# Patient Record
Sex: FEMALE | Race: WHITE | NOT HISPANIC OR LATINO | URBAN - METROPOLITAN AREA
[De-identification: names, ages, dates, MRNs, and addresses within clinical notes are randomized per-mention and may not be internally consistent; named-entity substitution may affect disease eponyms.]

---

## 2017-01-01 ENCOUNTER — INPATIENT (INPATIENT)
Facility: HOSPITAL | Age: 82
LOS: 13 days | DRG: 871 | End: 2017-02-02
Attending: HOSPITALIST | Admitting: HOSPITALIST
Payer: MEDICARE

## 2017-01-01 VITALS
RESPIRATION RATE: 24 BRPM | HEIGHT: 61 IN | HEART RATE: 180 BPM | DIASTOLIC BLOOD PRESSURE: 30 MMHG | WEIGHT: 134.04 LBS | SYSTOLIC BLOOD PRESSURE: 70 MMHG

## 2017-01-01 VITALS
TEMPERATURE: 98 F | HEART RATE: 116 BPM | OXYGEN SATURATION: 94 % | RESPIRATION RATE: 19 BRPM | DIASTOLIC BLOOD PRESSURE: 76 MMHG | SYSTOLIC BLOOD PRESSURE: 162 MMHG

## 2017-01-01 DIAGNOSIS — Z98.890 OTHER SPECIFIED POSTPROCEDURAL STATES: Chronic | ICD-10-CM

## 2017-01-01 DIAGNOSIS — I48.91 UNSPECIFIED ATRIAL FIBRILLATION: ICD-10-CM

## 2017-01-01 DIAGNOSIS — Z41.8 ENCOUNTER FOR OTHER PROCEDURES FOR PURPOSES OTHER THAN REMEDYING HEALTH STATE: ICD-10-CM

## 2017-01-01 DIAGNOSIS — E03.9 HYPOTHYROIDISM, UNSPECIFIED: ICD-10-CM

## 2017-01-01 DIAGNOSIS — A41.9 SEPSIS, UNSPECIFIED ORGANISM: ICD-10-CM

## 2017-01-01 DIAGNOSIS — N17.9 ACUTE KIDNEY FAILURE, UNSPECIFIED: ICD-10-CM

## 2017-01-01 DIAGNOSIS — E78.5 HYPERLIPIDEMIA, UNSPECIFIED: ICD-10-CM

## 2017-01-01 DIAGNOSIS — I10 ESSENTIAL (PRIMARY) HYPERTENSION: ICD-10-CM

## 2017-01-01 LAB
ALBUMIN SERPL ELPH-MCNC: 1.9 G/DL — LOW (ref 3.3–5)
ALBUMIN SERPL ELPH-MCNC: 2.1 G/DL — LOW (ref 3.3–5)
ALBUMIN SERPL ELPH-MCNC: 2.3 G/DL — LOW (ref 3.3–5)
ALBUMIN SERPL ELPH-MCNC: 2.4 G/DL — LOW (ref 3.3–5)
ALBUMIN SERPL ELPH-MCNC: 2.5 G/DL — LOW (ref 3.3–5)
ALP SERPL-CCNC: 103 U/L — SIGNIFICANT CHANGE UP (ref 40–120)
ALP SERPL-CCNC: 106 U/L — SIGNIFICANT CHANGE UP (ref 40–120)
ALP SERPL-CCNC: 73 U/L — SIGNIFICANT CHANGE UP (ref 40–120)
ALP SERPL-CCNC: 88 U/L — SIGNIFICANT CHANGE UP (ref 40–120)
ALP SERPL-CCNC: 90 U/L — SIGNIFICANT CHANGE UP (ref 40–120)
ALT FLD-CCNC: 23 U/L — SIGNIFICANT CHANGE UP (ref 12–78)
ALT FLD-CCNC: 31 U/L — SIGNIFICANT CHANGE UP (ref 12–78)
ALT FLD-CCNC: 37 U/L — SIGNIFICANT CHANGE UP (ref 12–78)
ALT FLD-CCNC: 38 U/L — SIGNIFICANT CHANGE UP (ref 12–78)
ALT FLD-CCNC: 40 U/L — SIGNIFICANT CHANGE UP (ref 12–78)
ANION GAP SERPL CALC-SCNC: 10 MMOL/L — SIGNIFICANT CHANGE UP (ref 5–17)
ANION GAP SERPL CALC-SCNC: 11 MMOL/L — SIGNIFICANT CHANGE UP (ref 5–17)
ANION GAP SERPL CALC-SCNC: 12 MMOL/L — SIGNIFICANT CHANGE UP (ref 5–17)
ANION GAP SERPL CALC-SCNC: 13 MMOL/L — SIGNIFICANT CHANGE UP (ref 5–17)
ANION GAP SERPL CALC-SCNC: 14 MMOL/L — SIGNIFICANT CHANGE UP (ref 5–17)
ANION GAP SERPL CALC-SCNC: 14 MMOL/L — SIGNIFICANT CHANGE UP (ref 5–17)
ANION GAP SERPL CALC-SCNC: 9 MMOL/L — SIGNIFICANT CHANGE UP (ref 5–17)
ANION GAP SERPL CALC-SCNC: 9 MMOL/L — SIGNIFICANT CHANGE UP (ref 5–17)
ANISOCYTOSIS BLD QL: SLIGHT — SIGNIFICANT CHANGE UP
APPEARANCE UR: ABNORMAL
APPEARANCE UR: ABNORMAL
APPEARANCE UR: CLEAR — SIGNIFICANT CHANGE UP
APTT BLD: 27.1 SEC — LOW (ref 27.5–37.4)
APTT BLD: 31.9 SEC — SIGNIFICANT CHANGE UP (ref 27.5–37.4)
AST SERPL-CCNC: 23 U/L — SIGNIFICANT CHANGE UP (ref 15–37)
AST SERPL-CCNC: 35 U/L — SIGNIFICANT CHANGE UP (ref 15–37)
AST SERPL-CCNC: 37 U/L — SIGNIFICANT CHANGE UP (ref 15–37)
AST SERPL-CCNC: 59 U/L — HIGH (ref 15–37)
AST SERPL-CCNC: 67 U/L — HIGH (ref 15–37)
BACTERIA # UR AUTO: ABNORMAL
BASE EXCESS BLDA CALC-SCNC: 4.1 MMOL/L — HIGH (ref -2–2)
BASOPHILS # BLD AUTO: 0 K/UL — SIGNIFICANT CHANGE UP (ref 0–0.2)
BASOPHILS NFR BLD AUTO: 0.2 % — SIGNIFICANT CHANGE UP (ref 0–2)
BILIRUB SERPL-MCNC: 0.5 MG/DL — SIGNIFICANT CHANGE UP (ref 0.2–1.2)
BILIRUB SERPL-MCNC: 0.5 MG/DL — SIGNIFICANT CHANGE UP (ref 0.2–1.2)
BILIRUB SERPL-MCNC: 0.7 MG/DL — SIGNIFICANT CHANGE UP (ref 0.2–1.2)
BILIRUB SERPL-MCNC: 0.7 MG/DL — SIGNIFICANT CHANGE UP (ref 0.2–1.2)
BILIRUB SERPL-MCNC: 0.8 MG/DL — SIGNIFICANT CHANGE UP (ref 0.2–1.2)
BILIRUB UR-MCNC: ABNORMAL
BILIRUB UR-MCNC: NEGATIVE — SIGNIFICANT CHANGE UP
BILIRUB UR-MCNC: NEGATIVE — SIGNIFICANT CHANGE UP
BLOOD GAS COMMENTS ARTERIAL: SIGNIFICANT CHANGE UP
BLOOD GAS COMMENTS ARTERIAL: SIGNIFICANT CHANGE UP
BUN SERPL-MCNC: 19 MG/DL — SIGNIFICANT CHANGE UP (ref 7–23)
BUN SERPL-MCNC: 21 MG/DL — SIGNIFICANT CHANGE UP (ref 7–23)
BUN SERPL-MCNC: 22 MG/DL — SIGNIFICANT CHANGE UP (ref 7–23)
BUN SERPL-MCNC: 23 MG/DL — SIGNIFICANT CHANGE UP (ref 7–23)
BUN SERPL-MCNC: 24 MG/DL — HIGH (ref 7–23)
BUN SERPL-MCNC: 24 MG/DL — HIGH (ref 7–23)
BUN SERPL-MCNC: 25 MG/DL — HIGH (ref 7–23)
BUN SERPL-MCNC: 27 MG/DL — HIGH (ref 7–23)
BUN SERPL-MCNC: 27 MG/DL — HIGH (ref 7–23)
BUN SERPL-MCNC: 28 MG/DL — HIGH (ref 7–23)
BUN SERPL-MCNC: 28 MG/DL — HIGH (ref 7–23)
BUN SERPL-MCNC: 29 MG/DL — HIGH (ref 7–23)
BUN SERPL-MCNC: 30 MG/DL — HIGH (ref 7–23)
BUN SERPL-MCNC: 31 MG/DL — HIGH (ref 7–23)
BUN SERPL-MCNC: 37 MG/DL — HIGH (ref 7–23)
BUN SERPL-MCNC: 43 MG/DL — HIGH (ref 7–23)
BUN SERPL-MCNC: 50 MG/DL — HIGH (ref 7–23)
CALCIUM SERPL-MCNC: 7.4 MG/DL — LOW (ref 8.5–10.1)
CALCIUM SERPL-MCNC: 7.5 MG/DL — LOW (ref 8.5–10.1)
CALCIUM SERPL-MCNC: 7.8 MG/DL — LOW (ref 8.5–10.1)
CALCIUM SERPL-MCNC: 7.9 MG/DL — LOW (ref 8.5–10.1)
CALCIUM SERPL-MCNC: 8.1 MG/DL — LOW (ref 8.5–10.1)
CALCIUM SERPL-MCNC: 8.1 MG/DL — LOW (ref 8.5–10.1)
CALCIUM SERPL-MCNC: 8.2 MG/DL — LOW (ref 8.5–10.1)
CALCIUM SERPL-MCNC: 8.3 MG/DL — LOW (ref 8.5–10.1)
CALCIUM SERPL-MCNC: 8.3 MG/DL — LOW (ref 8.5–10.1)
CALCIUM SERPL-MCNC: 8.6 MG/DL — SIGNIFICANT CHANGE UP (ref 8.5–10.1)
CALCIUM SERPL-MCNC: 8.6 MG/DL — SIGNIFICANT CHANGE UP (ref 8.5–10.1)
CALCIUM SERPL-MCNC: 8.7 MG/DL — SIGNIFICANT CHANGE UP (ref 8.5–10.1)
CALCIUM SERPL-MCNC: 8.8 MG/DL — SIGNIFICANT CHANGE UP (ref 8.5–10.1)
CALCIUM SERPL-MCNC: 8.9 MG/DL — SIGNIFICANT CHANGE UP (ref 8.5–10.1)
CALCIUM SERPL-MCNC: 9 MG/DL — SIGNIFICANT CHANGE UP (ref 8.5–10.1)
CHLORIDE SERPL-SCNC: 105 MMOL/L — SIGNIFICANT CHANGE UP (ref 96–108)
CHLORIDE SERPL-SCNC: 106 MMOL/L — SIGNIFICANT CHANGE UP (ref 96–108)
CHLORIDE SERPL-SCNC: 107 MMOL/L — SIGNIFICANT CHANGE UP (ref 96–108)
CHLORIDE SERPL-SCNC: 108 MMOL/L — SIGNIFICANT CHANGE UP (ref 96–108)
CHLORIDE SERPL-SCNC: 109 MMOL/L — HIGH (ref 96–108)
CHLORIDE SERPL-SCNC: 110 MMOL/L — HIGH (ref 96–108)
CHLORIDE SERPL-SCNC: 111 MMOL/L — HIGH (ref 96–108)
CHLORIDE SERPL-SCNC: 112 MMOL/L — HIGH (ref 96–108)
CHLORIDE SERPL-SCNC: 114 MMOL/L — HIGH (ref 96–108)
CHLORIDE SERPL-SCNC: 114 MMOL/L — HIGH (ref 96–108)
CHLORIDE SERPL-SCNC: 116 MMOL/L — HIGH (ref 96–108)
CK MB BLD-MCNC: 4.6 % — HIGH (ref 0–3.5)
CK MB CFR SERPL CALC: 10.4 NG/ML — HIGH (ref 0–3.6)
CK SERPL-CCNC: 227 U/L — HIGH (ref 26–192)
CO2 SERPL-SCNC: 20 MMOL/L — LOW (ref 22–31)
CO2 SERPL-SCNC: 22 MMOL/L — SIGNIFICANT CHANGE UP (ref 22–31)
CO2 SERPL-SCNC: 22 MMOL/L — SIGNIFICANT CHANGE UP (ref 22–31)
CO2 SERPL-SCNC: 24 MMOL/L — SIGNIFICANT CHANGE UP (ref 22–31)
CO2 SERPL-SCNC: 25 MMOL/L — SIGNIFICANT CHANGE UP (ref 22–31)
CO2 SERPL-SCNC: 25 MMOL/L — SIGNIFICANT CHANGE UP (ref 22–31)
CO2 SERPL-SCNC: 27 MMOL/L — SIGNIFICANT CHANGE UP (ref 22–31)
CO2 SERPL-SCNC: 28 MMOL/L — SIGNIFICANT CHANGE UP (ref 22–31)
CO2 SERPL-SCNC: 28 MMOL/L — SIGNIFICANT CHANGE UP (ref 22–31)
CO2 SERPL-SCNC: 29 MMOL/L — SIGNIFICANT CHANGE UP (ref 22–31)
CO2 SERPL-SCNC: 30 MMOL/L — SIGNIFICANT CHANGE UP (ref 22–31)
COLOR SPEC: YELLOW — SIGNIFICANT CHANGE UP
COMMENT - URINE: SIGNIFICANT CHANGE UP
CREAT SERPL-MCNC: 0.9 MG/DL — SIGNIFICANT CHANGE UP (ref 0.5–1.3)
CREAT SERPL-MCNC: 0.99 MG/DL — SIGNIFICANT CHANGE UP (ref 0.5–1.3)
CREAT SERPL-MCNC: 0.99 MG/DL — SIGNIFICANT CHANGE UP (ref 0.5–1.3)
CREAT SERPL-MCNC: 1 MG/DL — SIGNIFICANT CHANGE UP (ref 0.5–1.3)
CREAT SERPL-MCNC: 1.1 MG/DL — SIGNIFICANT CHANGE UP (ref 0.5–1.3)
CREAT SERPL-MCNC: 1.2 MG/DL — SIGNIFICANT CHANGE UP (ref 0.5–1.3)
CREAT SERPL-MCNC: 1.3 MG/DL — SIGNIFICANT CHANGE UP (ref 0.5–1.3)
CREAT SERPL-MCNC: 1.4 MG/DL — HIGH (ref 0.5–1.3)
CREAT SERPL-MCNC: 1.7 MG/DL — HIGH (ref 0.5–1.3)
CREAT SERPL-MCNC: 2.3 MG/DL — HIGH (ref 0.5–1.3)
CULTURE RESULTS: NO GROWTH — SIGNIFICANT CHANGE UP
CULTURE RESULTS: NO GROWTH — SIGNIFICANT CHANGE UP
CULTURE RESULTS: SIGNIFICANT CHANGE UP
DIFF PNL FLD: ABNORMAL
DIGOXIN SERPL-MCNC: 1.9 NG/ML — SIGNIFICANT CHANGE UP (ref 0.8–2)
ELLIPTOCYTES BLD QL SMEAR: SLIGHT — SIGNIFICANT CHANGE UP
EOSINOPHIL # BLD AUTO: 0 K/UL — SIGNIFICANT CHANGE UP (ref 0–0.5)
EOSINOPHIL NFR BLD AUTO: 0.1 % — SIGNIFICANT CHANGE UP (ref 0–6)
EPI CELLS # UR: SIGNIFICANT CHANGE UP
GLUCOSE SERPL-MCNC: 112 MG/DL — HIGH (ref 70–99)
GLUCOSE SERPL-MCNC: 115 MG/DL — HIGH (ref 70–99)
GLUCOSE SERPL-MCNC: 116 MG/DL — HIGH (ref 70–99)
GLUCOSE SERPL-MCNC: 117 MG/DL — HIGH (ref 70–99)
GLUCOSE SERPL-MCNC: 120 MG/DL — HIGH (ref 70–99)
GLUCOSE SERPL-MCNC: 122 MG/DL — HIGH (ref 70–99)
GLUCOSE SERPL-MCNC: 143 MG/DL — HIGH (ref 70–99)
GLUCOSE SERPL-MCNC: 143 MG/DL — HIGH (ref 70–99)
GLUCOSE SERPL-MCNC: 148 MG/DL — HIGH (ref 70–99)
GLUCOSE SERPL-MCNC: 150 MG/DL — HIGH (ref 70–99)
GLUCOSE SERPL-MCNC: 151 MG/DL — HIGH (ref 70–99)
GLUCOSE SERPL-MCNC: 158 MG/DL — HIGH (ref 70–99)
GLUCOSE SERPL-MCNC: 159 MG/DL — HIGH (ref 70–99)
GLUCOSE SERPL-MCNC: 161 MG/DL — HIGH (ref 70–99)
GLUCOSE SERPL-MCNC: 162 MG/DL — HIGH (ref 70–99)
GLUCOSE SERPL-MCNC: 163 MG/DL — HIGH (ref 70–99)
GLUCOSE SERPL-MCNC: 301 MG/DL — HIGH (ref 70–99)
GLUCOSE UR QL: NEGATIVE — SIGNIFICANT CHANGE UP
GRAM STN FLD: SIGNIFICANT CHANGE UP
HCO3 BLDA-SCNC: 28 MMOL/L — HIGH (ref 23–27)
HCT VFR BLD CALC: 30.3 % — LOW (ref 34.5–45)
HCT VFR BLD CALC: 31.5 % — LOW (ref 34.5–45)
HCT VFR BLD CALC: 31.7 % — LOW (ref 34.5–45)
HCT VFR BLD CALC: 31.9 % — LOW (ref 34.5–45)
HCT VFR BLD CALC: 32.4 % — LOW (ref 34.5–45)
HCT VFR BLD CALC: 34.8 % — SIGNIFICANT CHANGE UP (ref 34.5–45)
HCT VFR BLD CALC: 35.1 % — SIGNIFICANT CHANGE UP (ref 34.5–45)
HCT VFR BLD CALC: 35.4 % — SIGNIFICANT CHANGE UP (ref 34.5–45)
HCT VFR BLD CALC: 35.7 % — SIGNIFICANT CHANGE UP (ref 34.5–45)
HCT VFR BLD CALC: 36 % — SIGNIFICANT CHANGE UP (ref 34.5–45)
HCT VFR BLD CALC: 36.2 % — SIGNIFICANT CHANGE UP (ref 34.5–45)
HCT VFR BLD CALC: 36.2 % — SIGNIFICANT CHANGE UP (ref 34.5–45)
HCT VFR BLD CALC: 36.5 % — SIGNIFICANT CHANGE UP (ref 34.5–45)
HCT VFR BLD CALC: 37.2 % — SIGNIFICANT CHANGE UP (ref 34.5–45)
HCT VFR BLD CALC: 38.4 % — SIGNIFICANT CHANGE UP (ref 34.5–45)
HGB BLD-MCNC: 10.2 G/DL — LOW (ref 11.5–15.5)
HGB BLD-MCNC: 10.4 G/DL — LOW (ref 11.5–15.5)
HGB BLD-MCNC: 10.7 G/DL — LOW (ref 11.5–15.5)
HGB BLD-MCNC: 11.2 G/DL — LOW (ref 11.5–15.5)
HGB BLD-MCNC: 11.2 G/DL — LOW (ref 11.5–15.5)
HGB BLD-MCNC: 11.4 G/DL — LOW (ref 11.5–15.5)
HGB BLD-MCNC: 11.4 G/DL — LOW (ref 11.5–15.5)
HGB BLD-MCNC: 11.6 G/DL — SIGNIFICANT CHANGE UP (ref 11.5–15.5)
HGB BLD-MCNC: 11.6 G/DL — SIGNIFICANT CHANGE UP (ref 11.5–15.5)
HGB BLD-MCNC: 11.7 G/DL — SIGNIFICANT CHANGE UP (ref 11.5–15.5)
HGB BLD-MCNC: 11.7 G/DL — SIGNIFICANT CHANGE UP (ref 11.5–15.5)
HGB BLD-MCNC: 12 G/DL — SIGNIFICANT CHANGE UP (ref 11.5–15.5)
HGB BLD-MCNC: 12 G/DL — SIGNIFICANT CHANGE UP (ref 11.5–15.5)
HGB BLD-MCNC: 9.8 G/DL — LOW (ref 11.5–15.5)
HGB BLD-MCNC: 9.9 G/DL — LOW (ref 11.5–15.5)
HOROWITZ INDEX BLDA+IHG-RTO: 50 — SIGNIFICANT CHANGE UP
INR BLD: 1.34 RATIO — HIGH (ref 0.88–1.16)
INR BLD: 1.53 RATIO — HIGH (ref 0.88–1.16)
KETONES UR-MCNC: NEGATIVE — SIGNIFICANT CHANGE UP
LACTATE SERPL-SCNC: 1.8 MMOL/L — SIGNIFICANT CHANGE UP (ref 0.7–2)
LACTATE SERPL-SCNC: 2.2 MMOL/L — HIGH (ref 0.7–2)
LACTATE SERPL-SCNC: 2.3 MMOL/L — HIGH (ref 0.7–2)
LACTATE SERPL-SCNC: 2.8 MMOL/L — HIGH (ref 0.7–2)
LACTATE SERPL-SCNC: 2.8 MMOL/L — HIGH (ref 0.7–2)
LACTATE SERPL-SCNC: 2.9 MMOL/L — HIGH (ref 0.7–2)
LDH SERPL L TO P-CCNC: 318 U/L — HIGH (ref 50–242)
LDH SERPL L TO P-CCNC: 536 U/L — HIGH (ref 50–242)
LEGIONELLA AG UR QL: NEGATIVE — SIGNIFICANT CHANGE UP
LEUKOCYTE ESTERASE UR-ACNC: ABNORMAL
LEUKOCYTE ESTERASE UR-ACNC: ABNORMAL
LEUKOCYTE ESTERASE UR-ACNC: NEGATIVE — SIGNIFICANT CHANGE UP
LYMPHOCYTES # BLD AUTO: 1 % — LOW (ref 13–44)
LYMPHOCYTES # BLD AUTO: 1.4 K/UL — SIGNIFICANT CHANGE UP (ref 1–3.3)
LYMPHOCYTES # BLD AUTO: 5 % — LOW (ref 13–44)
LYMPHOCYTES # BLD AUTO: 6 % — LOW (ref 13–44)
LYMPHOCYTES # BLD AUTO: 7 % — LOW (ref 13–44)
LYMPHOCYTES # BLD AUTO: 7.1 % — LOW (ref 13–44)
MAGNESIUM SERPL-MCNC: 1.8 MG/DL — SIGNIFICANT CHANGE UP (ref 1.8–2.4)
MAGNESIUM SERPL-MCNC: 2 MG/DL — SIGNIFICANT CHANGE UP (ref 1.8–2.4)
MAGNESIUM SERPL-MCNC: 2.1 MG/DL — SIGNIFICANT CHANGE UP (ref 1.8–2.4)
MAGNESIUM SERPL-MCNC: 2.2 MG/DL — SIGNIFICANT CHANGE UP (ref 1.8–2.4)
MAGNESIUM SERPL-MCNC: 2.2 MG/DL — SIGNIFICANT CHANGE UP (ref 1.8–2.4)
MAGNESIUM SERPL-MCNC: 2.3 MG/DL — SIGNIFICANT CHANGE UP (ref 1.8–2.4)
MCHC RBC-ENTMCNC: 26.1 PG — LOW (ref 27–34)
MCHC RBC-ENTMCNC: 26.1 PG — LOW (ref 27–34)
MCHC RBC-ENTMCNC: 26.2 PG — LOW (ref 27–34)
MCHC RBC-ENTMCNC: 26.2 PG — LOW (ref 27–34)
MCHC RBC-ENTMCNC: 26.3 PG — LOW (ref 27–34)
MCHC RBC-ENTMCNC: 26.5 PG — LOW (ref 27–34)
MCHC RBC-ENTMCNC: 26.5 PG — LOW (ref 27–34)
MCHC RBC-ENTMCNC: 26.6 PG — LOW (ref 27–34)
MCHC RBC-ENTMCNC: 26.8 PG — LOW (ref 27–34)
MCHC RBC-ENTMCNC: 26.9 PG — LOW (ref 27–34)
MCHC RBC-ENTMCNC: 27 PG — SIGNIFICANT CHANGE UP (ref 27–34)
MCHC RBC-ENTMCNC: 27.2 PG — SIGNIFICANT CHANGE UP (ref 27–34)
MCHC RBC-ENTMCNC: 27.7 PG — SIGNIFICANT CHANGE UP (ref 27–34)
MCHC RBC-ENTMCNC: 31.3 GM/DL — LOW (ref 32–36)
MCHC RBC-ENTMCNC: 31.3 GM/DL — LOW (ref 32–36)
MCHC RBC-ENTMCNC: 31.7 GM/DL — LOW (ref 32–36)
MCHC RBC-ENTMCNC: 31.8 GM/DL — LOW (ref 32–36)
MCHC RBC-ENTMCNC: 31.8 GM/DL — LOW (ref 32–36)
MCHC RBC-ENTMCNC: 32 GM/DL — SIGNIFICANT CHANGE UP (ref 32–36)
MCHC RBC-ENTMCNC: 32.2 GM/DL — SIGNIFICANT CHANGE UP (ref 32–36)
MCHC RBC-ENTMCNC: 32.3 GM/DL — SIGNIFICANT CHANGE UP (ref 32–36)
MCHC RBC-ENTMCNC: 32.4 GM/DL — SIGNIFICANT CHANGE UP (ref 32–36)
MCHC RBC-ENTMCNC: 32.4 GM/DL — SIGNIFICANT CHANGE UP (ref 32–36)
MCHC RBC-ENTMCNC: 32.6 GM/DL — SIGNIFICANT CHANGE UP (ref 32–36)
MCHC RBC-ENTMCNC: 32.7 GM/DL — SIGNIFICANT CHANGE UP (ref 32–36)
MCHC RBC-ENTMCNC: 32.9 GM/DL — SIGNIFICANT CHANGE UP (ref 32–36)
MCV RBC AUTO: 80.4 FL — SIGNIFICANT CHANGE UP (ref 80–100)
MCV RBC AUTO: 80.8 FL — SIGNIFICANT CHANGE UP (ref 80–100)
MCV RBC AUTO: 81.2 FL — SIGNIFICANT CHANGE UP (ref 80–100)
MCV RBC AUTO: 81.2 FL — SIGNIFICANT CHANGE UP (ref 80–100)
MCV RBC AUTO: 81.8 FL — SIGNIFICANT CHANGE UP (ref 80–100)
MCV RBC AUTO: 82 FL — SIGNIFICANT CHANGE UP (ref 80–100)
MCV RBC AUTO: 82.4 FL — SIGNIFICANT CHANGE UP (ref 80–100)
MCV RBC AUTO: 82.5 FL — SIGNIFICANT CHANGE UP (ref 80–100)
MCV RBC AUTO: 82.8 FL — SIGNIFICANT CHANGE UP (ref 80–100)
MCV RBC AUTO: 83.5 FL — SIGNIFICANT CHANGE UP (ref 80–100)
MCV RBC AUTO: 84 FL — SIGNIFICANT CHANGE UP (ref 80–100)
MCV RBC AUTO: 84.4 FL — SIGNIFICANT CHANGE UP (ref 80–100)
MCV RBC AUTO: 84.7 FL — SIGNIFICANT CHANGE UP (ref 80–100)
MCV RBC AUTO: 85.8 FL — SIGNIFICANT CHANGE UP (ref 80–100)
MCV RBC AUTO: 86.2 FL — SIGNIFICANT CHANGE UP (ref 80–100)
MICROCYTES BLD QL: SLIGHT — SIGNIFICANT CHANGE UP
MONOCYTES # BLD AUTO: 0.7 K/UL — SIGNIFICANT CHANGE UP (ref 0–0.9)
MONOCYTES NFR BLD AUTO: 1 % — SIGNIFICANT CHANGE UP (ref 1–9)
MONOCYTES NFR BLD AUTO: 3 % — SIGNIFICANT CHANGE UP (ref 1–9)
MONOCYTES NFR BLD AUTO: 3.5 % — SIGNIFICANT CHANGE UP (ref 1–9)
MONOCYTES NFR BLD AUTO: 4 % — SIGNIFICANT CHANGE UP (ref 1–9)
MONOCYTES NFR BLD AUTO: 5 % — SIGNIFICANT CHANGE UP (ref 1–9)
NEUTROPHILS # BLD AUTO: 17.6 K/UL — HIGH (ref 1.8–7.4)
NEUTROPHILS NFR BLD AUTO: 87 % — HIGH (ref 43–77)
NEUTROPHILS NFR BLD AUTO: 88 % — HIGH (ref 43–77)
NEUTROPHILS NFR BLD AUTO: 89 % — HIGH (ref 43–77)
NEUTROPHILS NFR BLD AUTO: 89.1 % — HIGH (ref 43–77)
NEUTROPHILS NFR BLD AUTO: 95 % — HIGH (ref 43–77)
NEUTS BAND # BLD: 2 % — SIGNIFICANT CHANGE UP (ref 0–8)
NEUTS VAC BLD QL SMEAR: SLIGHT — SIGNIFICANT CHANGE UP
NITRITE UR-MCNC: NEGATIVE — SIGNIFICANT CHANGE UP
NT-PROBNP SERPL-SCNC: HIGH PG/ML (ref 0–450)
PCO2 BLDA: 42 MMHG — SIGNIFICANT CHANGE UP (ref 32–46)
PH BLDA: 7.44 — SIGNIFICANT CHANGE UP (ref 7.35–7.45)
PH UR: 5 — SIGNIFICANT CHANGE UP (ref 4.8–8)
PHOSPHATE SERPL-MCNC: 2.9 MG/DL — SIGNIFICANT CHANGE UP (ref 2.5–4.5)
PHOSPHATE SERPL-MCNC: 3 MG/DL — SIGNIFICANT CHANGE UP (ref 2.5–4.5)
PHOSPHATE SERPL-MCNC: 3 MG/DL — SIGNIFICANT CHANGE UP (ref 2.5–4.5)
PHOSPHATE SERPL-MCNC: 3.1 MG/DL — SIGNIFICANT CHANGE UP (ref 2.5–4.5)
PHOSPHATE SERPL-MCNC: 3.2 MG/DL — SIGNIFICANT CHANGE UP (ref 2.5–4.5)
PHOSPHATE SERPL-MCNC: 3.5 MG/DL — SIGNIFICANT CHANGE UP (ref 2.5–4.5)
PHOSPHATE SERPL-MCNC: 3.6 MG/DL — SIGNIFICANT CHANGE UP (ref 2.5–4.5)
PHOSPHATE SERPL-MCNC: 3.7 MG/DL — SIGNIFICANT CHANGE UP (ref 2.5–4.5)
PHOSPHATE SERPL-MCNC: 4.1 MG/DL — SIGNIFICANT CHANGE UP (ref 2.5–4.5)
PLAT MORPH BLD: NORMAL — SIGNIFICANT CHANGE UP
PLATELET # BLD AUTO: 215 K/UL — SIGNIFICANT CHANGE UP (ref 150–400)
PLATELET # BLD AUTO: 240 K/UL — SIGNIFICANT CHANGE UP (ref 150–400)
PLATELET # BLD AUTO: 292 K/UL — SIGNIFICANT CHANGE UP (ref 150–400)
PLATELET # BLD AUTO: 352 K/UL — SIGNIFICANT CHANGE UP (ref 150–400)
PLATELET # BLD AUTO: 366 K/UL — SIGNIFICANT CHANGE UP (ref 150–400)
PLATELET # BLD AUTO: 400 K/UL — SIGNIFICANT CHANGE UP (ref 150–400)
PLATELET # BLD AUTO: 405 K/UL — HIGH (ref 150–400)
PLATELET # BLD AUTO: 410 K/UL — HIGH (ref 150–400)
PLATELET # BLD AUTO: 410 K/UL — HIGH (ref 150–400)
PLATELET # BLD AUTO: 415 K/UL — HIGH (ref 150–400)
PLATELET # BLD AUTO: 441 K/UL — HIGH (ref 150–400)
PLATELET # BLD AUTO: 453 K/UL — HIGH (ref 150–400)
PLATELET # BLD AUTO: 503 K/UL — HIGH (ref 150–400)
PLATELET # BLD AUTO: 545 K/UL — HIGH (ref 150–400)
PLATELET # BLD AUTO: 548 K/UL — HIGH (ref 150–400)
PO2 BLDA: 62 MMHG — LOW (ref 74–108)
POIKILOCYTOSIS BLD QL AUTO: SLIGHT — SIGNIFICANT CHANGE UP
POLYCHROMASIA BLD QL SMEAR: SLIGHT — SIGNIFICANT CHANGE UP
POTASSIUM SERPL-MCNC: 3.1 MMOL/L — LOW (ref 3.5–5.3)
POTASSIUM SERPL-MCNC: 3.1 MMOL/L — LOW (ref 3.5–5.3)
POTASSIUM SERPL-MCNC: 3.2 MMOL/L — LOW (ref 3.5–5.3)
POTASSIUM SERPL-MCNC: 3.3 MMOL/L — LOW (ref 3.5–5.3)
POTASSIUM SERPL-MCNC: 3.3 MMOL/L — LOW (ref 3.5–5.3)
POTASSIUM SERPL-MCNC: 3.4 MMOL/L — LOW (ref 3.5–5.3)
POTASSIUM SERPL-MCNC: 3.6 MMOL/L — SIGNIFICANT CHANGE UP (ref 3.5–5.3)
POTASSIUM SERPL-MCNC: 3.6 MMOL/L — SIGNIFICANT CHANGE UP (ref 3.5–5.3)
POTASSIUM SERPL-MCNC: 3.7 MMOL/L — SIGNIFICANT CHANGE UP (ref 3.5–5.3)
POTASSIUM SERPL-MCNC: 3.7 MMOL/L — SIGNIFICANT CHANGE UP (ref 3.5–5.3)
POTASSIUM SERPL-MCNC: 3.8 MMOL/L — SIGNIFICANT CHANGE UP (ref 3.5–5.3)
POTASSIUM SERPL-MCNC: 3.8 MMOL/L — SIGNIFICANT CHANGE UP (ref 3.5–5.3)
POTASSIUM SERPL-MCNC: 3.9 MMOL/L — SIGNIFICANT CHANGE UP (ref 3.5–5.3)
POTASSIUM SERPL-MCNC: 4.1 MMOL/L — SIGNIFICANT CHANGE UP (ref 3.5–5.3)
POTASSIUM SERPL-MCNC: 4.3 MMOL/L — SIGNIFICANT CHANGE UP (ref 3.5–5.3)
POTASSIUM SERPL-SCNC: 3.1 MMOL/L — LOW (ref 3.5–5.3)
POTASSIUM SERPL-SCNC: 3.1 MMOL/L — LOW (ref 3.5–5.3)
POTASSIUM SERPL-SCNC: 3.2 MMOL/L — LOW (ref 3.5–5.3)
POTASSIUM SERPL-SCNC: 3.3 MMOL/L — LOW (ref 3.5–5.3)
POTASSIUM SERPL-SCNC: 3.3 MMOL/L — LOW (ref 3.5–5.3)
POTASSIUM SERPL-SCNC: 3.4 MMOL/L — LOW (ref 3.5–5.3)
POTASSIUM SERPL-SCNC: 3.6 MMOL/L — SIGNIFICANT CHANGE UP (ref 3.5–5.3)
POTASSIUM SERPL-SCNC: 3.6 MMOL/L — SIGNIFICANT CHANGE UP (ref 3.5–5.3)
POTASSIUM SERPL-SCNC: 3.7 MMOL/L — SIGNIFICANT CHANGE UP (ref 3.5–5.3)
POTASSIUM SERPL-SCNC: 3.7 MMOL/L — SIGNIFICANT CHANGE UP (ref 3.5–5.3)
POTASSIUM SERPL-SCNC: 3.8 MMOL/L — SIGNIFICANT CHANGE UP (ref 3.5–5.3)
POTASSIUM SERPL-SCNC: 3.8 MMOL/L — SIGNIFICANT CHANGE UP (ref 3.5–5.3)
POTASSIUM SERPL-SCNC: 3.9 MMOL/L — SIGNIFICANT CHANGE UP (ref 3.5–5.3)
POTASSIUM SERPL-SCNC: 4.1 MMOL/L — SIGNIFICANT CHANGE UP (ref 3.5–5.3)
POTASSIUM SERPL-SCNC: 4.3 MMOL/L — SIGNIFICANT CHANGE UP (ref 3.5–5.3)
PROCALCITONIN SERPL-MCNC: 0.09 NG/ML — HIGH (ref 0–0.05)
PROCALCITONIN SERPL-MCNC: 0.09 NG/ML — HIGH (ref 0–0.05)
PROCALCITONIN SERPL-MCNC: 0.18 NG/ML — HIGH (ref 0–0.05)
PROT SERPL-MCNC: 5.8 G/DL — LOW (ref 6–8.3)
PROT SERPL-MCNC: 6 G/DL — SIGNIFICANT CHANGE UP (ref 6–8.3)
PROT SERPL-MCNC: 6.7 G/DL — SIGNIFICANT CHANGE UP (ref 6–8.3)
PROT SERPL-MCNC: 6.7 G/DL — SIGNIFICANT CHANGE UP (ref 6–8.3)
PROT SERPL-MCNC: 6.8 G/DL — SIGNIFICANT CHANGE UP (ref 6–8.3)
PROT UR-MCNC: 25 MG/DL
PROT UR-MCNC: 75 MG/DL
PROT UR-MCNC: 75 MG/DL
PROTHROM AB SERPL-ACNC: 14.9 SEC — HIGH (ref 10–13.1)
PROTHROM AB SERPL-ACNC: 17 SEC — HIGH (ref 10–13.1)
RAPID RVP RESULT: SIGNIFICANT CHANGE UP
RBC # BLD: 3.68 M/UL — LOW (ref 3.8–5.2)
RBC # BLD: 3.75 M/UL — LOW (ref 3.8–5.2)
RBC # BLD: 3.84 M/UL — SIGNIFICANT CHANGE UP (ref 3.8–5.2)
RBC # BLD: 3.85 M/UL — SIGNIFICANT CHANGE UP (ref 3.8–5.2)
RBC # BLD: 3.97 M/UL — SIGNIFICANT CHANGE UP (ref 3.8–5.2)
RBC # BLD: 4.21 M/UL — SIGNIFICANT CHANGE UP (ref 3.8–5.2)
RBC # BLD: 4.24 M/UL — SIGNIFICANT CHANGE UP (ref 3.8–5.2)
RBC # BLD: 4.31 M/UL — SIGNIFICANT CHANGE UP (ref 3.8–5.2)
RBC # BLD: 4.32 M/UL — SIGNIFICANT CHANGE UP (ref 3.8–5.2)
RBC # BLD: 4.37 M/UL — SIGNIFICANT CHANGE UP (ref 3.8–5.2)
RBC # BLD: 4.39 M/UL — SIGNIFICANT CHANGE UP (ref 3.8–5.2)
RBC # BLD: 4.4 M/UL — SIGNIFICANT CHANGE UP (ref 3.8–5.2)
RBC # BLD: 4.42 M/UL — SIGNIFICANT CHANGE UP (ref 3.8–5.2)
RBC # BLD: 4.43 M/UL — SIGNIFICANT CHANGE UP (ref 3.8–5.2)
RBC # BLD: 4.47 M/UL — SIGNIFICANT CHANGE UP (ref 3.8–5.2)
RBC # FLD: 13.3 % — SIGNIFICANT CHANGE UP (ref 10.3–14.5)
RBC # FLD: 13.8 % — SIGNIFICANT CHANGE UP (ref 10.3–14.5)
RBC # FLD: 13.8 % — SIGNIFICANT CHANGE UP (ref 10.3–14.5)
RBC # FLD: 13.9 % — SIGNIFICANT CHANGE UP (ref 10.3–14.5)
RBC # FLD: 14 % — SIGNIFICANT CHANGE UP (ref 10.3–14.5)
RBC # FLD: 14.4 % — SIGNIFICANT CHANGE UP (ref 10.3–14.5)
RBC # FLD: 14.6 % — HIGH (ref 10.3–14.5)
RBC # FLD: 15 % — HIGH (ref 10.3–14.5)
RBC # FLD: 15.6 % — HIGH (ref 10.3–14.5)
RBC # FLD: 15.9 % — HIGH (ref 10.3–14.5)
RBC # FLD: 15.9 % — HIGH (ref 10.3–14.5)
RBC # FLD: 17.5 % — HIGH (ref 10.3–14.5)
RBC # FLD: 17.6 % — HIGH (ref 10.3–14.5)
RBC # FLD: 17.7 % — HIGH (ref 10.3–14.5)
RBC # FLD: 18.5 % — HIGH (ref 10.3–14.5)
RBC BLD AUTO: SIGNIFICANT CHANGE UP
RBC CASTS # UR COMP ASSIST: ABNORMAL /HPF (ref 0–4)
SAO2 % BLDA: 91 % — LOW (ref 92–96)
SCHISTOCYTES BLD QL AUTO: SLIGHT — SIGNIFICANT CHANGE UP
SODIUM SERPL-SCNC: 142 MMOL/L — SIGNIFICANT CHANGE UP (ref 135–145)
SODIUM SERPL-SCNC: 143 MMOL/L — SIGNIFICANT CHANGE UP (ref 135–145)
SODIUM SERPL-SCNC: 144 MMOL/L — SIGNIFICANT CHANGE UP (ref 135–145)
SODIUM SERPL-SCNC: 147 MMOL/L — HIGH (ref 135–145)
SODIUM SERPL-SCNC: 148 MMOL/L — HIGH (ref 135–145)
SODIUM SERPL-SCNC: 149 MMOL/L — HIGH (ref 135–145)
SODIUM SERPL-SCNC: 150 MMOL/L — HIGH (ref 135–145)
SODIUM SERPL-SCNC: 150 MMOL/L — HIGH (ref 135–145)
SODIUM SERPL-SCNC: 151 MMOL/L — HIGH (ref 135–145)
SODIUM SERPL-SCNC: 151 MMOL/L — HIGH (ref 135–145)
SODIUM SERPL-SCNC: 152 MMOL/L — HIGH (ref 135–145)
SODIUM SERPL-SCNC: 153 MMOL/L — HIGH (ref 135–145)
SP GR SPEC: 1.01 — SIGNIFICANT CHANGE UP (ref 1.01–1.02)
SP GR SPEC: 1.02 — SIGNIFICANT CHANGE UP (ref 1.01–1.02)
SP GR SPEC: 1.02 — SIGNIFICANT CHANGE UP (ref 1.01–1.02)
SPECIMEN SOURCE: SIGNIFICANT CHANGE UP
TARGETS BLD QL SMEAR: SLIGHT — SIGNIFICANT CHANGE UP
TOXIC GRANULES BLD QL SMEAR: PRESENT — SIGNIFICANT CHANGE UP
TROPONIN I SERPL-MCNC: 3.37 NG/ML — HIGH (ref 0.01–0.04)
TROPONIN I SERPL-MCNC: 3.77 NG/ML — HIGH (ref 0.01–0.04)
TSH SERPL-MCNC: 2.58 UIU/ML — SIGNIFICANT CHANGE UP (ref 0.36–3.74)
UROBILINOGEN FLD QL: 1
UROBILINOGEN FLD QL: NEGATIVE — SIGNIFICANT CHANGE UP
UROBILINOGEN FLD QL: NEGATIVE — SIGNIFICANT CHANGE UP
WBC # BLD: 10.3 K/UL — SIGNIFICANT CHANGE UP (ref 3.8–10.5)
WBC # BLD: 11.5 K/UL — HIGH (ref 3.8–10.5)
WBC # BLD: 12.9 K/UL — HIGH (ref 3.8–10.5)
WBC # BLD: 14.4 K/UL — HIGH (ref 3.8–10.5)
WBC # BLD: 15 K/UL — HIGH (ref 3.8–10.5)
WBC # BLD: 16.8 K/UL — HIGH (ref 3.8–10.5)
WBC # BLD: 16.9 K/UL — HIGH (ref 3.8–10.5)
WBC # BLD: 17.1 K/UL — HIGH (ref 3.8–10.5)
WBC # BLD: 18.5 K/UL — HIGH (ref 3.8–10.5)
WBC # BLD: 19.7 K/UL — HIGH (ref 3.8–10.5)
WBC # BLD: 20 K/UL — HIGH (ref 3.8–10.5)
WBC # BLD: 20.2 K/UL — HIGH (ref 3.8–10.5)
WBC # BLD: 20.5 K/UL — HIGH (ref 3.8–10.5)
WBC # BLD: 23.5 K/UL — HIGH (ref 3.8–10.5)
WBC # BLD: 40.6 K/UL — CRITICAL HIGH (ref 3.8–10.5)
WBC # FLD AUTO: 10.3 K/UL — SIGNIFICANT CHANGE UP (ref 3.8–10.5)
WBC # FLD AUTO: 11.5 K/UL — HIGH (ref 3.8–10.5)
WBC # FLD AUTO: 12.9 K/UL — HIGH (ref 3.8–10.5)
WBC # FLD AUTO: 14.4 K/UL — HIGH (ref 3.8–10.5)
WBC # FLD AUTO: 15 K/UL — HIGH (ref 3.8–10.5)
WBC # FLD AUTO: 16.8 K/UL — HIGH (ref 3.8–10.5)
WBC # FLD AUTO: 16.9 K/UL — HIGH (ref 3.8–10.5)
WBC # FLD AUTO: 17.1 K/UL — HIGH (ref 3.8–10.5)
WBC # FLD AUTO: 18.5 K/UL — HIGH (ref 3.8–10.5)
WBC # FLD AUTO: 19.7 K/UL — HIGH (ref 3.8–10.5)
WBC # FLD AUTO: 20 K/UL — HIGH (ref 3.8–10.5)
WBC # FLD AUTO: 20.2 K/UL — HIGH (ref 3.8–10.5)
WBC # FLD AUTO: 20.5 K/UL — HIGH (ref 3.8–10.5)
WBC # FLD AUTO: 23.5 K/UL — HIGH (ref 3.8–10.5)
WBC # FLD AUTO: 40.6 K/UL — CRITICAL HIGH (ref 3.8–10.5)
WBC UR QL: SIGNIFICANT CHANGE UP

## 2017-01-01 PROCEDURE — 87040 BLOOD CULTURE FOR BACTERIA: CPT

## 2017-01-01 PROCEDURE — 83735 ASSAY OF MAGNESIUM: CPT

## 2017-01-01 PROCEDURE — 80162 ASSAY OF DIGOXIN TOTAL: CPT

## 2017-01-01 PROCEDURE — 99233 SBSQ HOSP IP/OBS HIGH 50: CPT

## 2017-01-01 PROCEDURE — 93010 ELECTROCARDIOGRAM REPORT: CPT

## 2017-01-01 PROCEDURE — 71010: CPT | Mod: 26

## 2017-01-01 PROCEDURE — 84484 ASSAY OF TROPONIN QUANT: CPT

## 2017-01-01 PROCEDURE — 99291 CRITICAL CARE FIRST HOUR: CPT

## 2017-01-01 PROCEDURE — 87486 CHLMYD PNEUM DNA AMP PROBE: CPT

## 2017-01-01 PROCEDURE — 80053 COMPREHEN METABOLIC PANEL: CPT

## 2017-01-01 PROCEDURE — 99285 EMERGENCY DEPT VISIT HI MDM: CPT

## 2017-01-01 PROCEDURE — 99233 SBSQ HOSP IP/OBS HIGH 50: CPT | Mod: GC

## 2017-01-01 PROCEDURE — 90792 PSYCH DIAG EVAL W/MED SRVCS: CPT

## 2017-01-01 PROCEDURE — 83880 ASSAY OF NATRIURETIC PEPTIDE: CPT

## 2017-01-01 PROCEDURE — 31720 CLEARANCE OF AIRWAYS: CPT

## 2017-01-01 PROCEDURE — 71045 X-RAY EXAM CHEST 1 VIEW: CPT

## 2017-01-01 PROCEDURE — 97116 GAIT TRAINING THERAPY: CPT

## 2017-01-01 PROCEDURE — 83605 ASSAY OF LACTIC ACID: CPT

## 2017-01-01 PROCEDURE — 80048 BASIC METABOLIC PNL TOTAL CA: CPT

## 2017-01-01 PROCEDURE — 99285 EMERGENCY DEPT VISIT HI MDM: CPT | Mod: 25

## 2017-01-01 PROCEDURE — 82803 BLOOD GASES ANY COMBINATION: CPT

## 2017-01-01 PROCEDURE — 97530 THERAPEUTIC ACTIVITIES: CPT

## 2017-01-01 PROCEDURE — 94760 N-INVAS EAR/PLS OXIMETRY 1: CPT

## 2017-01-01 PROCEDURE — 96365 THER/PROPH/DIAG IV INF INIT: CPT

## 2017-01-01 PROCEDURE — 84443 ASSAY THYROID STIM HORMONE: CPT

## 2017-01-01 PROCEDURE — 87086 URINE CULTURE/COLONY COUNT: CPT

## 2017-01-01 PROCEDURE — 96375 TX/PRO/DX INJ NEW DRUG ADDON: CPT

## 2017-01-01 PROCEDURE — 82553 CREATINE MB FRACTION: CPT

## 2017-01-01 PROCEDURE — 84145 PROCALCITONIN (PCT): CPT

## 2017-01-01 PROCEDURE — 87633 RESP VIRUS 12-25 TARGETS: CPT

## 2017-01-01 PROCEDURE — 94640 AIRWAY INHALATION TREATMENT: CPT

## 2017-01-01 PROCEDURE — 87449 NOS EACH ORGANISM AG IA: CPT

## 2017-01-01 PROCEDURE — 81001 URINALYSIS AUTO W/SCOPE: CPT

## 2017-01-01 PROCEDURE — 93306 TTE W/DOPPLER COMPLETE: CPT

## 2017-01-01 PROCEDURE — 84100 ASSAY OF PHOSPHORUS: CPT

## 2017-01-01 PROCEDURE — 96367 TX/PROPH/DG ADDL SEQ IV INF: CPT

## 2017-01-01 PROCEDURE — 99223 1ST HOSP IP/OBS HIGH 75: CPT | Mod: AI,GC

## 2017-01-01 PROCEDURE — 85610 PROTHROMBIN TIME: CPT

## 2017-01-01 PROCEDURE — 85027 COMPLETE CBC AUTOMATED: CPT

## 2017-01-01 PROCEDURE — 85730 THROMBOPLASTIN TIME PARTIAL: CPT

## 2017-01-01 PROCEDURE — 83036 HEMOGLOBIN GLYCOSYLATED A1C: CPT

## 2017-01-01 PROCEDURE — 83615 LACTATE (LD) (LDH) ENZYME: CPT

## 2017-01-01 PROCEDURE — 97162 PT EVAL MOD COMPLEX 30 MIN: CPT

## 2017-01-01 PROCEDURE — 87581 M.PNEUMON DNA AMP PROBE: CPT

## 2017-01-01 PROCEDURE — 93005 ELECTROCARDIOGRAM TRACING: CPT

## 2017-01-01 PROCEDURE — 82550 ASSAY OF CK (CPK): CPT

## 2017-01-01 PROCEDURE — 87798 DETECT AGENT NOS DNA AMP: CPT

## 2017-01-01 RX ORDER — METOPROLOL TARTRATE 50 MG
5 TABLET ORAL EVERY 6 HOURS
Qty: 0 | Refills: 0 | Status: DISCONTINUED | OUTPATIENT
Start: 2017-01-01 | End: 2017-01-01

## 2017-01-01 RX ORDER — CEFTRIAXONE 500 MG/1
1 INJECTION, POWDER, FOR SOLUTION INTRAMUSCULAR; INTRAVENOUS ONCE
Qty: 0 | Refills: 0 | Status: DISCONTINUED | OUTPATIENT
Start: 2017-01-01 | End: 2017-01-01

## 2017-01-01 RX ORDER — MORPHINE SULFATE 50 MG/1
1 CAPSULE, EXTENDED RELEASE ORAL ONCE
Qty: 0 | Refills: 0 | Status: DISCONTINUED | OUTPATIENT
Start: 2017-01-01 | End: 2017-01-01

## 2017-01-01 RX ORDER — METOPROLOL TARTRATE 50 MG
5 TABLET ORAL ONCE
Qty: 0 | Refills: 0 | Status: COMPLETED | OUTPATIENT
Start: 2017-01-01 | End: 2017-01-01

## 2017-01-01 RX ORDER — DIGOXIN 250 MCG
0.25 TABLET ORAL ONCE
Qty: 0 | Refills: 0 | Status: COMPLETED | OUTPATIENT
Start: 2017-01-01 | End: 2017-01-01

## 2017-01-01 RX ORDER — QUETIAPINE FUMARATE 200 MG/1
12.5 TABLET, FILM COATED ORAL ONCE
Qty: 0 | Refills: 0 | Status: COMPLETED | OUTPATIENT
Start: 2017-01-01 | End: 2017-01-01

## 2017-01-01 RX ORDER — OLANZAPINE 15 MG/1
5 TABLET, FILM COATED ORAL EVERY 6 HOURS
Qty: 0 | Refills: 0 | Status: DISCONTINUED | OUTPATIENT
Start: 2017-01-01 | End: 2017-01-01

## 2017-01-01 RX ORDER — DILTIAZEM HCL 120 MG
5 CAPSULE, EXT RELEASE 24 HR ORAL
Qty: 125 | Refills: 0 | Status: DISCONTINUED | OUTPATIENT
Start: 2017-01-01 | End: 2017-01-01

## 2017-01-01 RX ORDER — FUROSEMIDE 40 MG
40 TABLET ORAL ONCE
Qty: 0 | Refills: 0 | Status: COMPLETED | OUTPATIENT
Start: 2017-01-01 | End: 2017-01-01

## 2017-01-01 RX ORDER — AMIODARONE HYDROCHLORIDE 400 MG/1
150 TABLET ORAL ONCE
Qty: 0 | Refills: 0 | Status: COMPLETED | OUTPATIENT
Start: 2017-01-01 | End: 2017-01-01

## 2017-01-01 RX ORDER — FUROSEMIDE 40 MG
40 TABLET ORAL DAILY
Qty: 0 | Refills: 0 | Status: DISCONTINUED | OUTPATIENT
Start: 2017-01-01 | End: 2017-01-01

## 2017-01-01 RX ORDER — PANTOPRAZOLE SODIUM 20 MG/1
40 TABLET, DELAYED RELEASE ORAL
Qty: 0 | Refills: 0 | Status: DISCONTINUED | OUTPATIENT
Start: 2017-01-01 | End: 2017-01-01

## 2017-01-01 RX ORDER — POTASSIUM CHLORIDE 20 MEQ
10 PACKET (EA) ORAL
Qty: 0 | Refills: 0 | Status: COMPLETED | OUTPATIENT
Start: 2017-01-01 | End: 2017-01-01

## 2017-01-01 RX ORDER — METOPROLOL TARTRATE 50 MG
50 TABLET ORAL EVERY 6 HOURS
Qty: 0 | Refills: 0 | Status: DISCONTINUED | OUTPATIENT
Start: 2017-01-01 | End: 2017-01-01

## 2017-01-01 RX ORDER — VANCOMYCIN HCL 1 G
1000 VIAL (EA) INTRAVENOUS EVERY 24 HOURS
Qty: 0 | Refills: 0 | Status: DISCONTINUED | OUTPATIENT
Start: 2017-01-01 | End: 2017-01-01

## 2017-01-01 RX ORDER — ASPIRIN/CALCIUM CARB/MAGNESIUM 324 MG
1 TABLET ORAL
Qty: 0 | Refills: 0 | COMMUNITY

## 2017-01-01 RX ORDER — ROBINUL 0.2 MG/ML
2 INJECTION INTRAMUSCULAR; INTRAVENOUS ONCE
Qty: 0 | Refills: 0 | Status: COMPLETED | OUTPATIENT
Start: 2017-01-01 | End: 2017-01-01

## 2017-01-01 RX ORDER — PIPERACILLIN AND TAZOBACTAM 4; .5 G/20ML; G/20ML
3.38 INJECTION, POWDER, LYOPHILIZED, FOR SOLUTION INTRAVENOUS EVERY 8 HOURS
Qty: 0 | Refills: 0 | Status: DISCONTINUED | OUTPATIENT
Start: 2017-01-01 | End: 2017-01-01

## 2017-01-01 RX ORDER — DIGOXIN 250 MCG
0.12 TABLET ORAL DAILY
Qty: 0 | Refills: 0 | Status: DISCONTINUED | OUTPATIENT
Start: 2017-01-01 | End: 2017-01-01

## 2017-01-01 RX ORDER — ONDANSETRON 8 MG/1
4 TABLET, FILM COATED ORAL EVERY 6 HOURS
Qty: 0 | Refills: 0 | Status: DISCONTINUED | OUTPATIENT
Start: 2017-01-01 | End: 2017-01-01

## 2017-01-01 RX ORDER — AMIODARONE HYDROCHLORIDE 400 MG/1
400 TABLET ORAL EVERY 8 HOURS
Qty: 0 | Refills: 0 | Status: COMPLETED | OUTPATIENT
Start: 2017-01-01 | End: 2017-01-01

## 2017-01-01 RX ORDER — POTASSIUM CHLORIDE 20 MEQ
40 PACKET (EA) ORAL ONCE
Qty: 0 | Refills: 0 | Status: COMPLETED | OUTPATIENT
Start: 2017-01-01 | End: 2017-01-01

## 2017-01-01 RX ORDER — DILTIAZEM HCL 120 MG
15 CAPSULE, EXT RELEASE 24 HR ORAL
Qty: 125 | Refills: 0 | Status: DISCONTINUED | OUTPATIENT
Start: 2017-01-01 | End: 2017-01-01

## 2017-01-01 RX ORDER — NYSTATIN CREAM 100000 [USP'U]/G
1 CREAM TOPICAL
Qty: 0 | Refills: 0 | Status: DISCONTINUED | OUTPATIENT
Start: 2017-01-01 | End: 2017-01-01

## 2017-01-01 RX ORDER — ENOXAPARIN SODIUM 100 MG/ML
60 INJECTION SUBCUTANEOUS DAILY
Qty: 0 | Refills: 0 | Status: DISCONTINUED | OUTPATIENT
Start: 2017-01-01 | End: 2017-01-01

## 2017-01-01 RX ORDER — DEXMEDETOMIDINE HYDROCHLORIDE IN 0.9% SODIUM CHLORIDE 4 UG/ML
0.2 INJECTION INTRAVENOUS
Qty: 200 | Refills: 0 | Status: DISCONTINUED | OUTPATIENT
Start: 2017-01-01 | End: 2017-01-01

## 2017-01-01 RX ORDER — SODIUM CHLORIDE 9 MG/ML
3 INJECTION INTRAMUSCULAR; INTRAVENOUS; SUBCUTANEOUS ONCE
Qty: 0 | Refills: 0 | Status: COMPLETED | OUTPATIENT
Start: 2017-01-01 | End: 2017-01-01

## 2017-01-01 RX ORDER — QUETIAPINE FUMARATE 200 MG/1
12.5 TABLET, FILM COATED ORAL
Qty: 0 | Refills: 0 | Status: DISCONTINUED | OUTPATIENT
Start: 2017-01-01 | End: 2017-01-01

## 2017-01-01 RX ORDER — PIPERACILLIN AND TAZOBACTAM 4; .5 G/20ML; G/20ML
3.38 INJECTION, POWDER, LYOPHILIZED, FOR SOLUTION INTRAVENOUS EVERY 8 HOURS
Qty: 0 | Refills: 0 | Status: COMPLETED | OUTPATIENT
Start: 2017-01-01 | End: 2017-01-01

## 2017-01-01 RX ORDER — POTASSIUM CHLORIDE 20 MEQ
10 PACKET (EA) ORAL ONCE
Qty: 0 | Refills: 0 | Status: COMPLETED | OUTPATIENT
Start: 2017-01-01 | End: 2017-01-01

## 2017-01-01 RX ORDER — LEVOTHYROXINE SODIUM 125 MCG
1 TABLET ORAL
Qty: 0 | Refills: 0 | COMMUNITY

## 2017-01-01 RX ORDER — AMIODARONE HYDROCHLORIDE 400 MG/1
200 TABLET ORAL DAILY
Qty: 0 | Refills: 0 | Status: DISCONTINUED | OUTPATIENT
Start: 2017-01-01 | End: 2017-01-01

## 2017-01-01 RX ORDER — ACETAMINOPHEN 500 MG
650 TABLET ORAL EVERY 6 HOURS
Qty: 0 | Refills: 0 | Status: DISCONTINUED | OUTPATIENT
Start: 2017-01-01 | End: 2017-01-01

## 2017-01-01 RX ORDER — DIGOXIN 250 MCG
0.25 TABLET ORAL EVERY 6 HOURS
Qty: 0 | Refills: 0 | Status: COMPLETED | OUTPATIENT
Start: 2017-01-01 | End: 2017-01-01

## 2017-01-01 RX ORDER — SODIUM CHLORIDE 9 MG/ML
1000 INJECTION, SOLUTION INTRAVENOUS
Qty: 0 | Refills: 0 | Status: DISCONTINUED | OUTPATIENT
Start: 2017-01-01 | End: 2017-01-01

## 2017-01-01 RX ORDER — ASPIRIN/CALCIUM CARB/MAGNESIUM 324 MG
81 TABLET ORAL DAILY
Qty: 0 | Refills: 0 | Status: DISCONTINUED | OUTPATIENT
Start: 2017-01-01 | End: 2017-01-01

## 2017-01-01 RX ORDER — PIPERACILLIN AND TAZOBACTAM 4; .5 G/20ML; G/20ML
3.38 INJECTION, POWDER, LYOPHILIZED, FOR SOLUTION INTRAVENOUS ONCE
Qty: 0 | Refills: 0 | Status: DISCONTINUED | OUTPATIENT
Start: 2017-01-01 | End: 2017-01-01

## 2017-01-01 RX ORDER — CEFTRIAXONE 500 MG/1
1 INJECTION, POWDER, FOR SOLUTION INTRAMUSCULAR; INTRAVENOUS ONCE
Qty: 0 | Refills: 0 | Status: COMPLETED | OUTPATIENT
Start: 2017-01-01 | End: 2017-01-01

## 2017-01-01 RX ORDER — IPRATROPIUM/ALBUTEROL SULFATE 18-103MCG
3 AEROSOL WITH ADAPTER (GRAM) INHALATION EVERY 6 HOURS
Qty: 0 | Refills: 0 | Status: DISCONTINUED | OUTPATIENT
Start: 2017-01-01 | End: 2017-01-01

## 2017-01-01 RX ORDER — SODIUM CHLORIDE 9 MG/ML
1000 INJECTION INTRAMUSCULAR; INTRAVENOUS; SUBCUTANEOUS ONCE
Qty: 0 | Refills: 0 | Status: COMPLETED | OUTPATIENT
Start: 2017-01-01 | End: 2017-01-01

## 2017-01-01 RX ORDER — DIGOXIN 250 MCG
0.25 TABLET ORAL DAILY
Qty: 0 | Refills: 0 | Status: DISCONTINUED | OUTPATIENT
Start: 2017-01-01 | End: 2017-01-01

## 2017-01-01 RX ORDER — OMEPRAZOLE 10 MG/1
1 CAPSULE, DELAYED RELEASE ORAL
Qty: 0 | Refills: 0 | COMMUNITY

## 2017-01-01 RX ORDER — OLANZAPINE 15 MG/1
2.5 TABLET, FILM COATED ORAL
Qty: 0 | Refills: 0 | Status: DISCONTINUED | OUTPATIENT
Start: 2017-01-01 | End: 2017-01-01

## 2017-01-01 RX ORDER — METOPROLOL TARTRATE 50 MG
25 TABLET ORAL
Qty: 0 | Refills: 0 | Status: DISCONTINUED | OUTPATIENT
Start: 2017-01-01 | End: 2017-01-01

## 2017-01-01 RX ORDER — POTASSIUM CHLORIDE 20 MEQ
40 PACKET (EA) ORAL EVERY 4 HOURS
Qty: 0 | Refills: 0 | Status: DISCONTINUED | OUTPATIENT
Start: 2017-01-01 | End: 2017-01-01

## 2017-01-01 RX ORDER — AMIODARONE HYDROCHLORIDE 400 MG/1
1 TABLET ORAL
Qty: 450 | Refills: 0 | Status: DISCONTINUED | OUTPATIENT
Start: 2017-01-01 | End: 2017-01-01

## 2017-01-01 RX ORDER — SODIUM CHLORIDE 9 MG/ML
1000 INJECTION, SOLUTION INTRAVENOUS ONCE
Qty: 0 | Refills: 0 | Status: DISCONTINUED | OUTPATIENT
Start: 2017-01-01 | End: 2017-01-01

## 2017-01-01 RX ORDER — MEMANTINE HYDROCHLORIDE 10 MG/1
1 TABLET ORAL
Qty: 0 | Refills: 0 | COMMUNITY

## 2017-01-01 RX ORDER — IRBESARTAN 75 MG/1
1 TABLET ORAL
Qty: 0 | Refills: 0 | COMMUNITY

## 2017-01-01 RX ORDER — AZITHROMYCIN 500 MG/1
500 TABLET, FILM COATED ORAL EVERY 24 HOURS
Qty: 0 | Refills: 0 | Status: DISCONTINUED | OUTPATIENT
Start: 2017-01-01 | End: 2017-01-01

## 2017-01-01 RX ORDER — DILTIAZEM HCL 120 MG
10 CAPSULE, EXT RELEASE 24 HR ORAL
Qty: 125 | Refills: 0 | Status: DISCONTINUED | OUTPATIENT
Start: 2017-01-01 | End: 2017-01-01

## 2017-01-01 RX ORDER — HALOPERIDOL DECANOATE 100 MG/ML
2 INJECTION INTRAMUSCULAR EVERY 6 HOURS
Qty: 0 | Refills: 0 | Status: DISCONTINUED | OUTPATIENT
Start: 2017-01-01 | End: 2017-01-01

## 2017-01-01 RX ORDER — SODIUM CHLORIDE 9 MG/ML
1000 INJECTION, SOLUTION INTRAVENOUS ONCE
Qty: 0 | Refills: 0 | Status: COMPLETED | OUTPATIENT
Start: 2017-01-01 | End: 2017-01-01

## 2017-01-01 RX ORDER — METOPROLOL TARTRATE 50 MG
75 TABLET ORAL EVERY 6 HOURS
Qty: 0 | Refills: 0 | Status: DISCONTINUED | OUTPATIENT
Start: 2017-01-01 | End: 2017-01-01

## 2017-01-01 RX ORDER — ATORVASTATIN CALCIUM 80 MG/1
80 TABLET, FILM COATED ORAL AT BEDTIME
Qty: 0 | Refills: 0 | Status: DISCONTINUED | OUTPATIENT
Start: 2017-01-01 | End: 2017-01-01

## 2017-01-01 RX ORDER — POTASSIUM CHLORIDE 20 MEQ
40 PACKET (EA) ORAL EVERY 4 HOURS
Qty: 0 | Refills: 0 | Status: COMPLETED | OUTPATIENT
Start: 2017-01-01 | End: 2017-01-01

## 2017-01-01 RX ORDER — AZITHROMYCIN 500 MG/1
500 TABLET, FILM COATED ORAL ONCE
Qty: 0 | Refills: 0 | Status: DISCONTINUED | OUTPATIENT
Start: 2017-01-01 | End: 2017-01-01

## 2017-01-01 RX ORDER — METOPROLOL TARTRATE 50 MG
50 TABLET ORAL EVERY 12 HOURS
Qty: 0 | Refills: 0 | Status: DISCONTINUED | OUTPATIENT
Start: 2017-01-01 | End: 2017-01-01

## 2017-01-01 RX ORDER — METOPROLOL TARTRATE 50 MG
4 TABLET ORAL ONCE
Qty: 0 | Refills: 0 | Status: DISCONTINUED | OUTPATIENT
Start: 2017-01-01 | End: 2017-01-01

## 2017-01-01 RX ORDER — QUETIAPINE FUMARATE 200 MG/1
25 TABLET, FILM COATED ORAL AT BEDTIME
Qty: 0 | Refills: 0 | Status: DISCONTINUED | OUTPATIENT
Start: 2017-01-01 | End: 2017-01-01

## 2017-01-01 RX ORDER — ROSUVASTATIN CALCIUM 5 MG/1
1 TABLET ORAL
Qty: 0 | Refills: 0 | COMMUNITY

## 2017-01-01 RX ORDER — LEVOTHYROXINE SODIUM 125 MCG
25 TABLET ORAL DAILY
Qty: 0 | Refills: 0 | Status: DISCONTINUED | OUTPATIENT
Start: 2017-01-01 | End: 2017-01-01

## 2017-01-01 RX ORDER — MEMANTINE HYDROCHLORIDE 10 MG/1
10 TABLET ORAL
Qty: 0 | Refills: 0 | Status: DISCONTINUED | OUTPATIENT
Start: 2017-01-01 | End: 2017-01-01

## 2017-01-01 RX ORDER — SENNA PLUS 8.6 MG/1
2 TABLET ORAL AT BEDTIME
Qty: 0 | Refills: 0 | Status: DISCONTINUED | OUTPATIENT
Start: 2017-01-01 | End: 2017-01-01

## 2017-01-01 RX ORDER — MIDODRINE HYDROCHLORIDE 2.5 MG/1
10 TABLET ORAL EVERY 8 HOURS
Qty: 0 | Refills: 0 | Status: DISCONTINUED | OUTPATIENT
Start: 2017-01-01 | End: 2017-01-01

## 2017-01-01 RX ORDER — AMIODARONE HYDROCHLORIDE 400 MG/1
200 TABLET ORAL DAILY
Qty: 0 | Refills: 0 | Status: COMPLETED | OUTPATIENT
Start: 2017-01-01 | End: 2017-12-29

## 2017-01-01 RX ORDER — CEFTRIAXONE 500 MG/1
1 INJECTION, POWDER, FOR SOLUTION INTRAMUSCULAR; INTRAVENOUS EVERY 24 HOURS
Qty: 0 | Refills: 0 | Status: DISCONTINUED | OUTPATIENT
Start: 2017-01-01 | End: 2017-01-01

## 2017-01-01 RX ORDER — AMIODARONE HYDROCHLORIDE 400 MG/1
0.5 TABLET ORAL
Qty: 900 | Refills: 0 | Status: DISCONTINUED | OUTPATIENT
Start: 2017-01-01 | End: 2017-01-01

## 2017-01-01 RX ORDER — INFLUENZA VACCINE LIVE INTRANASAL 10000000; 10000000; 10000000; 10000000 MG/.2ML; MG/.2ML; MG/.2ML; MG/.2ML
0.1 SPRAY NASAL ONCE
Qty: 0 | Refills: 0 | Status: DISCONTINUED | OUTPATIENT
Start: 2017-01-01 | End: 2017-01-01

## 2017-01-01 RX ORDER — SODIUM CHLORIDE 9 MG/ML
500 INJECTION, SOLUTION INTRAVENOUS ONCE
Qty: 0 | Refills: 0 | Status: COMPLETED | OUTPATIENT
Start: 2017-01-01 | End: 2017-01-01

## 2017-01-01 RX ORDER — MORPHINE SULFATE 50 MG/1
2 CAPSULE, EXTENDED RELEASE ORAL ONCE
Qty: 0 | Refills: 0 | Status: DISCONTINUED | OUTPATIENT
Start: 2017-01-01 | End: 2017-01-01

## 2017-01-01 RX ORDER — IMIPENEM AND CILASTATIN 250; 250 MG/100ML; MG/100ML
500 INJECTION, POWDER, FOR SOLUTION INTRAVENOUS EVERY 12 HOURS
Qty: 0 | Refills: 0 | Status: DISCONTINUED | OUTPATIENT
Start: 2017-01-01 | End: 2017-01-01

## 2017-01-01 RX ORDER — ASPIRIN/CALCIUM CARB/MAGNESIUM 324 MG
325 TABLET ORAL ONCE
Qty: 0 | Refills: 0 | Status: COMPLETED | OUTPATIENT
Start: 2017-01-01 | End: 2017-01-01

## 2017-01-01 RX ORDER — AZITHROMYCIN 500 MG/1
500 TABLET, FILM COATED ORAL ONCE
Qty: 0 | Refills: 0 | Status: COMPLETED | OUTPATIENT
Start: 2017-01-01 | End: 2017-01-01

## 2017-01-01 RX ORDER — FUROSEMIDE 40 MG
20 TABLET ORAL ONCE
Qty: 0 | Refills: 0 | Status: COMPLETED | OUTPATIENT
Start: 2017-01-01 | End: 2017-01-01

## 2017-01-01 RX ADMIN — Medication 100 MILLIGRAM(S): at 23:45

## 2017-01-01 RX ADMIN — ATORVASTATIN CALCIUM 80 MILLIGRAM(S): 80 TABLET, FILM COATED ORAL at 21:22

## 2017-01-01 RX ADMIN — Medication 325 MILLIGRAM(S): at 17:24

## 2017-01-01 RX ADMIN — PANTOPRAZOLE SODIUM 40 MILLIGRAM(S): 20 TABLET, DELAYED RELEASE ORAL at 06:01

## 2017-01-01 RX ADMIN — Medication 81 MILLIGRAM(S): at 12:49

## 2017-01-01 RX ADMIN — Medication 50 MILLIGRAM(S): at 05:29

## 2017-01-01 RX ADMIN — DEXMEDETOMIDINE HYDROCHLORIDE IN 0.9% SODIUM CHLORIDE 3 MICROGRAM(S)/KG/HR: 4 INJECTION INTRAVENOUS at 07:49

## 2017-01-01 RX ADMIN — Medication 50 MILLIGRAM(S): at 06:43

## 2017-01-01 RX ADMIN — Medication 0.12 MILLIGRAM(S): at 06:43

## 2017-01-01 RX ADMIN — MEMANTINE HYDROCHLORIDE 10 MILLIGRAM(S): 10 TABLET ORAL at 17:28

## 2017-01-01 RX ADMIN — PANTOPRAZOLE SODIUM 40 MILLIGRAM(S): 20 TABLET, DELAYED RELEASE ORAL at 06:28

## 2017-01-01 RX ADMIN — SENNA PLUS 2 TABLET(S): 8.6 TABLET ORAL at 23:47

## 2017-01-01 RX ADMIN — Medication 0.12 MILLIGRAM(S): at 05:05

## 2017-01-01 RX ADMIN — MEMANTINE HYDROCHLORIDE 10 MILLIGRAM(S): 10 TABLET ORAL at 05:05

## 2017-01-01 RX ADMIN — Medication 5 MILLIGRAM(S): at 17:26

## 2017-01-01 RX ADMIN — Medication 50 MILLIGRAM(S): at 10:17

## 2017-01-01 RX ADMIN — PIPERACILLIN AND TAZOBACTAM 25 GRAM(S): 4; .5 INJECTION, POWDER, LYOPHILIZED, FOR SOLUTION INTRAVENOUS at 13:45

## 2017-01-01 RX ADMIN — DEXMEDETOMIDINE HYDROCHLORIDE IN 0.9% SODIUM CHLORIDE 3.04 MICROGRAM(S)/KG/HR: 4 INJECTION INTRAVENOUS at 21:57

## 2017-01-01 RX ADMIN — DEXMEDETOMIDINE HYDROCHLORIDE IN 0.9% SODIUM CHLORIDE 3.04 MICROGRAM(S)/KG/HR: 4 INJECTION INTRAVENOUS at 02:41

## 2017-01-01 RX ADMIN — Medication 81 MILLIGRAM(S): at 13:09

## 2017-01-01 RX ADMIN — AMIODARONE HYDROCHLORIDE 33.33 MG/MIN: 400 TABLET ORAL at 08:08

## 2017-01-01 RX ADMIN — Medication 25 MICROGRAM(S): at 06:14

## 2017-01-01 RX ADMIN — PIPERACILLIN AND TAZOBACTAM 25 GRAM(S): 4; .5 INJECTION, POWDER, LYOPHILIZED, FOR SOLUTION INTRAVENOUS at 05:11

## 2017-01-01 RX ADMIN — NYSTATIN CREAM 1 APPLICATION(S): 100000 CREAM TOPICAL at 17:54

## 2017-01-01 RX ADMIN — OLANZAPINE 2.5 MILLIGRAM(S): 15 TABLET, FILM COATED ORAL at 05:23

## 2017-01-01 RX ADMIN — SODIUM CHLORIDE 2000 MILLILITER(S): 9 INJECTION INTRAMUSCULAR; INTRAVENOUS; SUBCUTANEOUS at 17:27

## 2017-01-01 RX ADMIN — Medication 50 MILLIGRAM(S): at 17:54

## 2017-01-01 RX ADMIN — Medication 81 MILLIGRAM(S): at 12:57

## 2017-01-01 RX ADMIN — MEMANTINE HYDROCHLORIDE 10 MILLIGRAM(S): 10 TABLET ORAL at 17:54

## 2017-01-01 RX ADMIN — MEMANTINE HYDROCHLORIDE 10 MILLIGRAM(S): 10 TABLET ORAL at 05:39

## 2017-01-01 RX ADMIN — AMIODARONE HYDROCHLORIDE 400 MILLIGRAM(S): 400 TABLET ORAL at 21:53

## 2017-01-01 RX ADMIN — Medication 0.12 MILLIGRAM(S): at 05:28

## 2017-01-01 RX ADMIN — Medication 5 MILLIGRAM(S): at 11:41

## 2017-01-01 RX ADMIN — AMIODARONE HYDROCHLORIDE 200 MILLIGRAM(S): 400 TABLET ORAL at 18:06

## 2017-01-01 RX ADMIN — Medication 50 MILLIGRAM(S): at 12:49

## 2017-01-01 RX ADMIN — MEMANTINE HYDROCHLORIDE 10 MILLIGRAM(S): 10 TABLET ORAL at 05:49

## 2017-01-01 RX ADMIN — Medication 25 MILLIGRAM(S): at 18:30

## 2017-01-01 RX ADMIN — MEMANTINE HYDROCHLORIDE 10 MILLIGRAM(S): 10 TABLET ORAL at 17:16

## 2017-01-01 RX ADMIN — AMIODARONE HYDROCHLORIDE 400 MILLIGRAM(S): 400 TABLET ORAL at 13:51

## 2017-01-01 RX ADMIN — AMIODARONE HYDROCHLORIDE 618 MILLIGRAM(S): 400 TABLET ORAL at 01:46

## 2017-01-01 RX ADMIN — SODIUM CHLORIDE 1000 MILLILITER(S): 9 INJECTION INTRAMUSCULAR; INTRAVENOUS; SUBCUTANEOUS at 19:02

## 2017-01-01 RX ADMIN — PIPERACILLIN AND TAZOBACTAM 25 GRAM(S): 4; .5 INJECTION, POWDER, LYOPHILIZED, FOR SOLUTION INTRAVENOUS at 06:14

## 2017-01-01 RX ADMIN — AMIODARONE HYDROCHLORIDE 400 MILLIGRAM(S): 400 TABLET ORAL at 13:09

## 2017-01-01 RX ADMIN — ENOXAPARIN SODIUM 60 MILLIGRAM(S): 100 INJECTION SUBCUTANEOUS at 11:46

## 2017-01-01 RX ADMIN — Medication 50 MILLIGRAM(S): at 23:26

## 2017-01-01 RX ADMIN — Medication 50 MILLIGRAM(S): at 05:18

## 2017-01-01 RX ADMIN — Medication 650 MILLIGRAM(S): at 14:01

## 2017-01-01 RX ADMIN — SODIUM CHLORIDE 1000 MILLILITER(S): 9 INJECTION INTRAMUSCULAR; INTRAVENOUS; SUBCUTANEOUS at 11:00

## 2017-01-01 RX ADMIN — Medication 0.25 MILLIGRAM(S): at 01:46

## 2017-01-01 RX ADMIN — NYSTATIN CREAM 1 APPLICATION(S): 100000 CREAM TOPICAL at 06:21

## 2017-01-01 RX ADMIN — Medication 50 MILLIGRAM(S): at 17:16

## 2017-01-01 RX ADMIN — MORPHINE SULFATE 1 MILLIGRAM(S): 50 CAPSULE, EXTENDED RELEASE ORAL at 12:28

## 2017-01-01 RX ADMIN — Medication 40 MILLIEQUIVALENT(S): at 13:46

## 2017-01-01 RX ADMIN — ENOXAPARIN SODIUM 60 MILLIGRAM(S): 100 INJECTION SUBCUTANEOUS at 13:10

## 2017-01-01 RX ADMIN — OLANZAPINE 2.5 MILLIGRAM(S): 15 TABLET, FILM COATED ORAL at 18:10

## 2017-01-01 RX ADMIN — Medication 5 MILLIGRAM(S): at 00:03

## 2017-01-01 RX ADMIN — Medication 81 MILLIGRAM(S): at 12:07

## 2017-01-01 RX ADMIN — Medication 50 MILLIGRAM(S): at 12:33

## 2017-01-01 RX ADMIN — ENOXAPARIN SODIUM 60 MILLIGRAM(S): 100 INJECTION SUBCUTANEOUS at 12:07

## 2017-01-01 RX ADMIN — Medication 15 MG/HR: at 19:49

## 2017-01-01 RX ADMIN — Medication 50 MILLIGRAM(S): at 05:05

## 2017-01-01 RX ADMIN — AMIODARONE HYDROCHLORIDE 400 MILLIGRAM(S): 400 TABLET ORAL at 14:32

## 2017-01-01 RX ADMIN — PANTOPRAZOLE SODIUM 40 MILLIGRAM(S): 20 TABLET, DELAYED RELEASE ORAL at 05:28

## 2017-01-01 RX ADMIN — Medication 40 MILLIEQUIVALENT(S): at 00:37

## 2017-01-01 RX ADMIN — MORPHINE SULFATE 2 MILLIGRAM(S): 50 CAPSULE, EXTENDED RELEASE ORAL at 05:34

## 2017-01-01 RX ADMIN — Medication 50 MILLIGRAM(S): at 06:01

## 2017-01-01 RX ADMIN — Medication 5 MILLIGRAM(S): at 04:50

## 2017-01-01 RX ADMIN — Medication 15 MG/HR: at 02:42

## 2017-01-01 RX ADMIN — MEMANTINE HYDROCHLORIDE 10 MILLIGRAM(S): 10 TABLET ORAL at 17:26

## 2017-01-01 RX ADMIN — Medication 3 MILLILITER(S): at 07:53

## 2017-01-01 RX ADMIN — AMIODARONE HYDROCHLORIDE 400 MILLIGRAM(S): 400 TABLET ORAL at 21:36

## 2017-01-01 RX ADMIN — Medication 25 MICROGRAM(S): at 05:49

## 2017-01-01 RX ADMIN — PIPERACILLIN AND TAZOBACTAM 25 GRAM(S): 4; .5 INJECTION, POWDER, LYOPHILIZED, FOR SOLUTION INTRAVENOUS at 05:50

## 2017-01-01 RX ADMIN — NYSTATIN CREAM 1 APPLICATION(S): 100000 CREAM TOPICAL at 18:10

## 2017-01-01 RX ADMIN — Medication 0.12 MILLIGRAM(S): at 06:42

## 2017-01-01 RX ADMIN — ATORVASTATIN CALCIUM 80 MILLIGRAM(S): 80 TABLET, FILM COATED ORAL at 21:01

## 2017-01-01 RX ADMIN — Medication 5 MILLIGRAM(S): at 23:23

## 2017-01-01 RX ADMIN — Medication 81 MILLIGRAM(S): at 11:45

## 2017-01-01 RX ADMIN — MEMANTINE HYDROCHLORIDE 10 MILLIGRAM(S): 10 TABLET ORAL at 18:30

## 2017-01-01 RX ADMIN — Medication 0.12 MILLIGRAM(S): at 06:13

## 2017-01-01 RX ADMIN — Medication 250 MILLIGRAM(S): at 12:31

## 2017-01-01 RX ADMIN — SODIUM CHLORIDE 1000 MILLILITER(S): 9 INJECTION, SOLUTION INTRAVENOUS at 03:24

## 2017-01-01 RX ADMIN — Medication 10 MG/HR: at 21:57

## 2017-01-01 RX ADMIN — Medication 50 MILLIGRAM(S): at 13:09

## 2017-01-01 RX ADMIN — MIDODRINE HYDROCHLORIDE 10 MILLIGRAM(S): 2.5 TABLET ORAL at 06:03

## 2017-01-01 RX ADMIN — Medication 50 MILLIGRAM(S): at 06:44

## 2017-01-01 RX ADMIN — MORPHINE SULFATE 1 MILLIGRAM(S): 50 CAPSULE, EXTENDED RELEASE ORAL at 23:33

## 2017-01-01 RX ADMIN — SODIUM CHLORIDE 75 MILLILITER(S): 9 INJECTION, SOLUTION INTRAVENOUS at 13:00

## 2017-01-01 RX ADMIN — Medication 75 MILLIGRAM(S): at 00:35

## 2017-01-01 RX ADMIN — Medication 100 MILLIEQUIVALENT(S): at 02:45

## 2017-01-01 RX ADMIN — PANTOPRAZOLE SODIUM 40 MILLIGRAM(S): 20 TABLET, DELAYED RELEASE ORAL at 06:03

## 2017-01-01 RX ADMIN — ATORVASTATIN CALCIUM 80 MILLIGRAM(S): 80 TABLET, FILM COATED ORAL at 21:58

## 2017-01-01 RX ADMIN — Medication 5 MG/HR: at 22:49

## 2017-01-01 RX ADMIN — NYSTATIN CREAM 1 APPLICATION(S): 100000 CREAM TOPICAL at 05:24

## 2017-01-01 RX ADMIN — Medication 100 MILLIEQUIVALENT(S): at 23:05

## 2017-01-01 RX ADMIN — Medication 0.12 MILLIGRAM(S): at 05:18

## 2017-01-01 RX ADMIN — Medication 10 MG/HR: at 05:28

## 2017-01-01 RX ADMIN — MEMANTINE HYDROCHLORIDE 10 MILLIGRAM(S): 10 TABLET ORAL at 06:01

## 2017-01-01 RX ADMIN — PIPERACILLIN AND TAZOBACTAM 25 GRAM(S): 4; .5 INJECTION, POWDER, LYOPHILIZED, FOR SOLUTION INTRAVENOUS at 13:46

## 2017-01-01 RX ADMIN — ENOXAPARIN SODIUM 60 MILLIGRAM(S): 100 INJECTION SUBCUTANEOUS at 12:14

## 2017-01-01 RX ADMIN — OLANZAPINE 5 MILLIGRAM(S): 15 TABLET, FILM COATED ORAL at 17:59

## 2017-01-01 RX ADMIN — Medication 25 MICROGRAM(S): at 06:03

## 2017-01-01 RX ADMIN — Medication 40 MILLIGRAM(S): at 02:23

## 2017-01-01 RX ADMIN — Medication 10 MG/HR: at 03:41

## 2017-01-01 RX ADMIN — MEMANTINE HYDROCHLORIDE 10 MILLIGRAM(S): 10 TABLET ORAL at 05:11

## 2017-01-01 RX ADMIN — Medication 50 MILLIGRAM(S): at 17:57

## 2017-01-01 RX ADMIN — ATORVASTATIN CALCIUM 80 MILLIGRAM(S): 80 TABLET, FILM COATED ORAL at 22:13

## 2017-01-01 RX ADMIN — Medication 10 MG/HR: at 22:08

## 2017-01-01 RX ADMIN — DEXMEDETOMIDINE HYDROCHLORIDE IN 0.9% SODIUM CHLORIDE 3 MICROGRAM(S)/KG/HR: 4 INJECTION INTRAVENOUS at 15:58

## 2017-01-01 RX ADMIN — NYSTATIN CREAM 1 APPLICATION(S): 100000 CREAM TOPICAL at 05:23

## 2017-01-01 RX ADMIN — Medication 20 MILLIGRAM(S): at 17:44

## 2017-01-01 RX ADMIN — ENOXAPARIN SODIUM 60 MILLIGRAM(S): 100 INJECTION SUBCUTANEOUS at 11:35

## 2017-01-01 RX ADMIN — Medication 0.25 MILLIGRAM(S): at 03:02

## 2017-01-01 RX ADMIN — Medication 81 MILLIGRAM(S): at 11:43

## 2017-01-01 RX ADMIN — PIPERACILLIN AND TAZOBACTAM 25 GRAM(S): 4; .5 INJECTION, POWDER, LYOPHILIZED, FOR SOLUTION INTRAVENOUS at 12:38

## 2017-01-01 RX ADMIN — Medication 100 MILLIGRAM(S): at 02:46

## 2017-01-01 RX ADMIN — Medication 10 MG/HR: at 05:34

## 2017-01-01 RX ADMIN — PIPERACILLIN AND TAZOBACTAM 25 GRAM(S): 4; .5 INJECTION, POWDER, LYOPHILIZED, FOR SOLUTION INTRAVENOUS at 23:46

## 2017-01-01 RX ADMIN — Medication 30 MILLIGRAM(S): at 17:26

## 2017-01-01 RX ADMIN — PIPERACILLIN AND TAZOBACTAM 25 GRAM(S): 4; .5 INJECTION, POWDER, LYOPHILIZED, FOR SOLUTION INTRAVENOUS at 13:55

## 2017-01-01 RX ADMIN — ATORVASTATIN CALCIUM 80 MILLIGRAM(S): 80 TABLET, FILM COATED ORAL at 23:15

## 2017-01-01 RX ADMIN — PANTOPRAZOLE SODIUM 40 MILLIGRAM(S): 20 TABLET, DELAYED RELEASE ORAL at 06:42

## 2017-01-01 RX ADMIN — Medication 50 MILLIGRAM(S): at 17:58

## 2017-01-01 RX ADMIN — MEMANTINE HYDROCHLORIDE 10 MILLIGRAM(S): 10 TABLET ORAL at 17:59

## 2017-01-01 RX ADMIN — Medication 650 MILLIGRAM(S): at 20:00

## 2017-01-01 RX ADMIN — AMIODARONE HYDROCHLORIDE 400 MILLIGRAM(S): 400 TABLET ORAL at 11:34

## 2017-01-01 RX ADMIN — ENOXAPARIN SODIUM 60 MILLIGRAM(S): 100 INJECTION SUBCUTANEOUS at 11:42

## 2017-01-01 RX ADMIN — Medication 50 MILLIGRAM(S): at 17:01

## 2017-01-01 RX ADMIN — PIPERACILLIN AND TAZOBACTAM 25 GRAM(S): 4; .5 INJECTION, POWDER, LYOPHILIZED, FOR SOLUTION INTRAVENOUS at 13:03

## 2017-01-01 RX ADMIN — IMIPENEM AND CILASTATIN 100 MILLIGRAM(S): 250; 250 INJECTION, POWDER, FOR SOLUTION INTRAVENOUS at 06:29

## 2017-01-01 RX ADMIN — Medication 50 MILLIGRAM(S): at 06:22

## 2017-01-01 RX ADMIN — Medication 3 MILLILITER(S): at 01:43

## 2017-01-01 RX ADMIN — Medication 0.12 MILLIGRAM(S): at 06:01

## 2017-01-01 RX ADMIN — HALOPERIDOL DECANOATE 2 MILLIGRAM(S): 100 INJECTION INTRAMUSCULAR at 21:58

## 2017-01-01 RX ADMIN — PANTOPRAZOLE SODIUM 40 MILLIGRAM(S): 20 TABLET, DELAYED RELEASE ORAL at 06:43

## 2017-01-01 RX ADMIN — Medication 100 MILLIEQUIVALENT(S): at 21:30

## 2017-01-01 RX ADMIN — MEMANTINE HYDROCHLORIDE 10 MILLIGRAM(S): 10 TABLET ORAL at 05:17

## 2017-01-01 RX ADMIN — Medication 100 MILLIEQUIVALENT(S): at 23:26

## 2017-01-01 RX ADMIN — MEMANTINE HYDROCHLORIDE 10 MILLIGRAM(S): 10 TABLET ORAL at 06:28

## 2017-01-01 RX ADMIN — Medication 81 MILLIGRAM(S): at 12:31

## 2017-01-01 RX ADMIN — Medication 40 MILLIEQUIVALENT(S): at 02:45

## 2017-01-01 RX ADMIN — PANTOPRAZOLE SODIUM 40 MILLIGRAM(S): 20 TABLET, DELAYED RELEASE ORAL at 05:05

## 2017-01-01 RX ADMIN — AMIODARONE HYDROCHLORIDE 200 MILLIGRAM(S): 400 TABLET ORAL at 05:17

## 2017-01-01 RX ADMIN — AMIODARONE HYDROCHLORIDE 400 MILLIGRAM(S): 400 TABLET ORAL at 05:39

## 2017-01-01 RX ADMIN — PIPERACILLIN AND TAZOBACTAM 25 GRAM(S): 4; .5 INJECTION, POWDER, LYOPHILIZED, FOR SOLUTION INTRAVENOUS at 14:16

## 2017-01-01 RX ADMIN — AMIODARONE HYDROCHLORIDE 400 MILLIGRAM(S): 400 TABLET ORAL at 06:01

## 2017-01-01 RX ADMIN — NYSTATIN CREAM 1 APPLICATION(S): 100000 CREAM TOPICAL at 17:27

## 2017-01-01 RX ADMIN — Medication 50 MILLIGRAM(S): at 17:35

## 2017-01-01 RX ADMIN — Medication 40 MILLIEQUIVALENT(S): at 12:49

## 2017-01-01 RX ADMIN — Medication 5 MILLIGRAM(S): at 02:41

## 2017-01-01 RX ADMIN — ENOXAPARIN SODIUM 60 MILLIGRAM(S): 100 INJECTION SUBCUTANEOUS at 12:31

## 2017-01-01 RX ADMIN — ATORVASTATIN CALCIUM 80 MILLIGRAM(S): 80 TABLET, FILM COATED ORAL at 21:36

## 2017-01-01 RX ADMIN — Medication 3 MILLILITER(S): at 01:59

## 2017-01-01 RX ADMIN — Medication 25 MICROGRAM(S): at 06:42

## 2017-01-01 RX ADMIN — Medication 3 MILLILITER(S): at 13:32

## 2017-01-01 RX ADMIN — HALOPERIDOL DECANOATE 2 MILLIGRAM(S): 100 INJECTION INTRAMUSCULAR at 23:48

## 2017-01-01 RX ADMIN — Medication 50 MILLIGRAM(S): at 23:27

## 2017-01-01 RX ADMIN — SODIUM CHLORIDE 1000 MILLILITER(S): 9 INJECTION INTRAMUSCULAR; INTRAVENOUS; SUBCUTANEOUS at 18:45

## 2017-01-01 RX ADMIN — Medication 50 MILLIGRAM(S): at 12:56

## 2017-01-01 RX ADMIN — IMIPENEM AND CILASTATIN 100 MILLIGRAM(S): 250; 250 INJECTION, POWDER, FOR SOLUTION INTRAVENOUS at 17:53

## 2017-01-01 RX ADMIN — MEMANTINE HYDROCHLORIDE 10 MILLIGRAM(S): 10 TABLET ORAL at 17:35

## 2017-01-01 RX ADMIN — ENOXAPARIN SODIUM 60 MILLIGRAM(S): 100 INJECTION SUBCUTANEOUS at 12:57

## 2017-01-01 RX ADMIN — AZITHROMYCIN 255 MILLIGRAM(S): 500 TABLET, FILM COATED ORAL at 17:26

## 2017-01-01 RX ADMIN — Medication 40 MILLIEQUIVALENT(S): at 09:51

## 2017-01-01 RX ADMIN — Medication 50 MILLIGRAM(S): at 12:42

## 2017-01-01 RX ADMIN — ATORVASTATIN CALCIUM 80 MILLIGRAM(S): 80 TABLET, FILM COATED ORAL at 23:46

## 2017-01-01 RX ADMIN — CEFTRIAXONE 100 GRAM(S): 500 INJECTION, POWDER, FOR SOLUTION INTRAMUSCULAR; INTRAVENOUS at 17:25

## 2017-01-01 RX ADMIN — SODIUM CHLORIDE 3 MILLILITER(S): 9 INJECTION INTRAMUSCULAR; INTRAVENOUS; SUBCUTANEOUS at 17:24

## 2017-01-01 RX ADMIN — Medication 3 MILLILITER(S): at 19:57

## 2017-01-01 RX ADMIN — Medication 5 MILLIGRAM(S): at 05:11

## 2017-01-01 RX ADMIN — Medication 0.12 MILLIGRAM(S): at 15:49

## 2017-01-01 RX ADMIN — Medication 25 MICROGRAM(S): at 06:01

## 2017-01-01 RX ADMIN — MORPHINE SULFATE 1 MILLIGRAM(S): 50 CAPSULE, EXTENDED RELEASE ORAL at 00:00

## 2017-01-01 RX ADMIN — MEMANTINE HYDROCHLORIDE 10 MILLIGRAM(S): 10 TABLET ORAL at 17:58

## 2017-01-01 RX ADMIN — MORPHINE SULFATE 1 MILLIGRAM(S): 50 CAPSULE, EXTENDED RELEASE ORAL at 21:59

## 2017-01-01 RX ADMIN — MEMANTINE HYDROCHLORIDE 10 MILLIGRAM(S): 10 TABLET ORAL at 06:42

## 2017-01-01 RX ADMIN — Medication 0.12 MILLIGRAM(S): at 05:41

## 2017-01-01 RX ADMIN — Medication 100 MILLIEQUIVALENT(S): at 21:22

## 2017-01-01 RX ADMIN — PIPERACILLIN AND TAZOBACTAM 25 GRAM(S): 4; .5 INJECTION, POWDER, LYOPHILIZED, FOR SOLUTION INTRAVENOUS at 22:07

## 2017-01-01 RX ADMIN — ENOXAPARIN SODIUM 60 MILLIGRAM(S): 100 INJECTION SUBCUTANEOUS at 12:41

## 2017-01-01 RX ADMIN — Medication 5 MILLIGRAM(S): at 19:52

## 2017-01-01 RX ADMIN — Medication 100 MILLIGRAM(S): at 10:27

## 2017-01-01 RX ADMIN — MEMANTINE HYDROCHLORIDE 10 MILLIGRAM(S): 10 TABLET ORAL at 06:43

## 2017-01-01 RX ADMIN — Medication 5 MILLIGRAM(S): at 23:07

## 2017-01-01 RX ADMIN — NYSTATIN CREAM 1 APPLICATION(S): 100000 CREAM TOPICAL at 05:05

## 2017-01-01 RX ADMIN — AMIODARONE HYDROCHLORIDE 618 MILLIGRAM(S): 400 TABLET ORAL at 16:31

## 2017-01-01 RX ADMIN — Medication 25 MICROGRAM(S): at 05:28

## 2017-01-01 RX ADMIN — DEXMEDETOMIDINE HYDROCHLORIDE IN 0.9% SODIUM CHLORIDE 3.04 MICROGRAM(S)/KG/HR: 4 INJECTION INTRAVENOUS at 23:47

## 2017-01-01 RX ADMIN — ATORVASTATIN CALCIUM 80 MILLIGRAM(S): 80 TABLET, FILM COATED ORAL at 23:34

## 2017-01-01 RX ADMIN — MEMANTINE HYDROCHLORIDE 10 MILLIGRAM(S): 10 TABLET ORAL at 06:19

## 2017-01-01 RX ADMIN — MORPHINE SULFATE 1 MILLIGRAM(S): 50 CAPSULE, EXTENDED RELEASE ORAL at 03:31

## 2017-01-01 RX ADMIN — Medication 50 MILLIGRAM(S): at 06:28

## 2017-01-01 RX ADMIN — Medication 3 MILLILITER(S): at 20:21

## 2017-01-01 RX ADMIN — DEXMEDETOMIDINE HYDROCHLORIDE IN 0.9% SODIUM CHLORIDE 3 MICROGRAM(S)/KG/HR: 4 INJECTION INTRAVENOUS at 03:45

## 2017-01-01 RX ADMIN — PIPERACILLIN AND TAZOBACTAM 25 GRAM(S): 4; .5 INJECTION, POWDER, LYOPHILIZED, FOR SOLUTION INTRAVENOUS at 21:58

## 2017-01-01 RX ADMIN — DEXMEDETOMIDINE HYDROCHLORIDE IN 0.9% SODIUM CHLORIDE 3.04 MICROGRAM(S)/KG/HR: 4 INJECTION INTRAVENOUS at 05:48

## 2017-01-01 RX ADMIN — Medication 40 MILLIGRAM(S): at 11:42

## 2017-01-01 RX ADMIN — Medication 25 MICROGRAM(S): at 06:28

## 2017-01-01 RX ADMIN — Medication 50 MILLIGRAM(S): at 23:48

## 2017-01-01 RX ADMIN — ENOXAPARIN SODIUM 60 MILLIGRAM(S): 100 INJECTION SUBCUTANEOUS at 12:49

## 2017-01-01 RX ADMIN — Medication 81 MILLIGRAM(S): at 11:54

## 2017-01-01 RX ADMIN — Medication 40 MILLIGRAM(S): at 05:05

## 2017-01-01 RX ADMIN — ROBINUL 2 MILLIGRAM(S): 0.2 INJECTION INTRAMUSCULAR; INTRAVENOUS at 05:43

## 2017-01-01 RX ADMIN — MIDODRINE HYDROCHLORIDE 10 MILLIGRAM(S): 2.5 TABLET ORAL at 02:45

## 2017-01-01 RX ADMIN — SODIUM CHLORIDE 2000 MILLILITER(S): 9 INJECTION, SOLUTION INTRAVENOUS at 22:03

## 2017-01-01 RX ADMIN — QUETIAPINE FUMARATE 25 MILLIGRAM(S): 200 TABLET, FILM COATED ORAL at 22:34

## 2017-01-01 RX ADMIN — Medication 40 MILLIGRAM(S): at 11:44

## 2017-01-01 RX ADMIN — DEXMEDETOMIDINE HYDROCHLORIDE IN 0.9% SODIUM CHLORIDE 3.04 MICROGRAM(S)/KG/HR: 4 INJECTION INTRAVENOUS at 18:03

## 2017-01-01 RX ADMIN — PANTOPRAZOLE SODIUM 40 MILLIGRAM(S): 20 TABLET, DELAYED RELEASE ORAL at 06:13

## 2017-01-01 RX ADMIN — Medication 50 MILLIGRAM(S): at 23:47

## 2017-01-01 RX ADMIN — Medication 3 MILLILITER(S): at 14:15

## 2017-01-01 RX ADMIN — Medication 5 MG/HR: at 17:43

## 2017-01-01 RX ADMIN — IMIPENEM AND CILASTATIN 100 MILLIGRAM(S): 250; 250 INJECTION, POWDER, FOR SOLUTION INTRAVENOUS at 17:26

## 2017-01-01 RX ADMIN — Medication 50 MILLIGRAM(S): at 18:00

## 2017-01-01 RX ADMIN — Medication 0.12 MILLIGRAM(S): at 06:28

## 2017-01-01 RX ADMIN — Medication 10 MG/HR: at 05:48

## 2017-01-01 RX ADMIN — Medication 25 MILLIGRAM(S): at 05:49

## 2017-01-01 RX ADMIN — PANTOPRAZOLE SODIUM 40 MILLIGRAM(S): 20 TABLET, DELAYED RELEASE ORAL at 05:40

## 2017-01-01 RX ADMIN — HALOPERIDOL DECANOATE 2 MILLIGRAM(S): 100 INJECTION INTRAMUSCULAR at 03:15

## 2017-01-01 RX ADMIN — PIPERACILLIN AND TAZOBACTAM 25 GRAM(S): 4; .5 INJECTION, POWDER, LYOPHILIZED, FOR SOLUTION INTRAVENOUS at 13:06

## 2017-01-01 RX ADMIN — Medication 25 MICROGRAM(S): at 05:05

## 2017-01-01 RX ADMIN — Medication 40 MILLIEQUIVALENT(S): at 08:44

## 2017-01-01 RX ADMIN — MEMANTINE HYDROCHLORIDE 10 MILLIGRAM(S): 10 TABLET ORAL at 05:28

## 2017-01-01 RX ADMIN — Medication 25 MICROGRAM(S): at 06:43

## 2017-01-01 RX ADMIN — AMIODARONE HYDROCHLORIDE 400 MILLIGRAM(S): 400 TABLET ORAL at 06:42

## 2017-01-01 RX ADMIN — MEMANTINE HYDROCHLORIDE 10 MILLIGRAM(S): 10 TABLET ORAL at 17:44

## 2017-01-01 RX ADMIN — MEMANTINE HYDROCHLORIDE 10 MILLIGRAM(S): 10 TABLET ORAL at 17:57

## 2017-01-01 RX ADMIN — Medication 75 MILLIGRAM(S): at 05:39

## 2017-01-01 RX ADMIN — ATORVASTATIN CALCIUM 80 MILLIGRAM(S): 80 TABLET, FILM COATED ORAL at 21:37

## 2017-01-01 RX ADMIN — PIPERACILLIN AND TAZOBACTAM 25 GRAM(S): 4; .5 INJECTION, POWDER, LYOPHILIZED, FOR SOLUTION INTRAVENOUS at 23:14

## 2017-01-01 RX ADMIN — AMIODARONE HYDROCHLORIDE 618 MILLIGRAM(S): 400 TABLET ORAL at 22:45

## 2017-01-01 RX ADMIN — SODIUM CHLORIDE 75 MILLILITER(S): 9 INJECTION, SOLUTION INTRAVENOUS at 12:14

## 2017-01-01 RX ADMIN — PANTOPRAZOLE SODIUM 40 MILLIGRAM(S): 20 TABLET, DELAYED RELEASE ORAL at 05:50

## 2017-01-01 RX ADMIN — PANTOPRAZOLE SODIUM 40 MILLIGRAM(S): 20 TABLET, DELAYED RELEASE ORAL at 05:18

## 2017-01-01 RX ADMIN — Medication 10 MG/HR: at 23:55

## 2017-01-01 RX ADMIN — Medication 81 MILLIGRAM(S): at 11:42

## 2017-01-01 RX ADMIN — Medication 25 MICROGRAM(S): at 05:11

## 2017-01-01 RX ADMIN — Medication 100 MILLIEQUIVALENT(S): at 18:36

## 2017-01-01 RX ADMIN — ENOXAPARIN SODIUM 60 MILLIGRAM(S): 100 INJECTION SUBCUTANEOUS at 11:43

## 2017-01-01 RX ADMIN — Medication 25 MICROGRAM(S): at 06:20

## 2017-01-01 RX ADMIN — NYSTATIN CREAM 1 APPLICATION(S): 100000 CREAM TOPICAL at 21:37

## 2017-01-01 RX ADMIN — ATORVASTATIN CALCIUM 80 MILLIGRAM(S): 80 TABLET, FILM COATED ORAL at 21:53

## 2017-01-01 RX ADMIN — AZITHROMYCIN 255 MILLIGRAM(S): 500 TABLET, FILM COATED ORAL at 17:44

## 2017-01-01 RX ADMIN — MEMANTINE HYDROCHLORIDE 10 MILLIGRAM(S): 10 TABLET ORAL at 06:13

## 2017-01-01 RX ADMIN — PIPERACILLIN AND TAZOBACTAM 25 GRAM(S): 4; .5 INJECTION, POWDER, LYOPHILIZED, FOR SOLUTION INTRAVENOUS at 22:35

## 2017-01-01 RX ADMIN — QUETIAPINE FUMARATE 12.5 MILLIGRAM(S): 200 TABLET, FILM COATED ORAL at 10:56

## 2017-01-01 RX ADMIN — AZITHROMYCIN 255 MILLIGRAM(S): 500 TABLET, FILM COATED ORAL at 16:36

## 2017-01-01 RX ADMIN — Medication 75 MILLIGRAM(S): at 17:28

## 2017-01-01 RX ADMIN — Medication 75 MILLIGRAM(S): at 11:45

## 2017-01-01 RX ADMIN — Medication 0.12 MILLIGRAM(S): at 06:20

## 2017-01-01 RX ADMIN — Medication 10 MG/HR: at 15:00

## 2017-01-01 RX ADMIN — Medication 25 MICROGRAM(S): at 05:40

## 2017-01-01 RX ADMIN — HALOPERIDOL DECANOATE 2 MILLIGRAM(S): 100 INJECTION INTRAMUSCULAR at 18:33

## 2017-01-01 RX ADMIN — PIPERACILLIN AND TAZOBACTAM 25 GRAM(S): 4; .5 INJECTION, POWDER, LYOPHILIZED, FOR SOLUTION INTRAVENOUS at 05:29

## 2017-01-01 RX ADMIN — QUETIAPINE FUMARATE 12.5 MILLIGRAM(S): 200 TABLET, FILM COATED ORAL at 14:32

## 2017-01-01 RX ADMIN — ATORVASTATIN CALCIUM 80 MILLIGRAM(S): 80 TABLET, FILM COATED ORAL at 21:57

## 2017-01-01 RX ADMIN — Medication 5 MILLIGRAM(S): at 02:22

## 2017-01-01 RX ADMIN — Medication 250 MILLIGRAM(S): at 10:53

## 2017-01-01 RX ADMIN — MEMANTINE HYDROCHLORIDE 10 MILLIGRAM(S): 10 TABLET ORAL at 04:51

## 2017-01-01 RX ADMIN — PIPERACILLIN AND TAZOBACTAM 25 GRAM(S): 4; .5 INJECTION, POWDER, LYOPHILIZED, FOR SOLUTION INTRAVENOUS at 21:37

## 2017-01-01 RX ADMIN — PANTOPRAZOLE SODIUM 40 MILLIGRAM(S): 20 TABLET, DELAYED RELEASE ORAL at 05:12

## 2017-01-01 RX ADMIN — AMIODARONE HYDROCHLORIDE 400 MILLIGRAM(S): 400 TABLET ORAL at 13:58

## 2017-01-01 RX ADMIN — ENOXAPARIN SODIUM 60 MILLIGRAM(S): 100 INJECTION SUBCUTANEOUS at 12:16

## 2017-01-01 RX ADMIN — AMIODARONE HYDROCHLORIDE 33.33 MG/MIN: 400 TABLET ORAL at 16:04

## 2017-01-01 RX ADMIN — DEXMEDETOMIDINE HYDROCHLORIDE IN 0.9% SODIUM CHLORIDE 3.04 MICROGRAM(S)/KG/HR: 4 INJECTION INTRAVENOUS at 09:23

## 2017-01-01 RX ADMIN — Medication 5 MILLIGRAM(S): at 04:01

## 2017-01-01 RX ADMIN — Medication 40 MILLIEQUIVALENT(S): at 02:23

## 2017-01-01 RX ADMIN — IMIPENEM AND CILASTATIN 100 MILLIGRAM(S): 250; 250 INJECTION, POWDER, FOR SOLUTION INTRAVENOUS at 04:51

## 2017-01-01 RX ADMIN — HALOPERIDOL DECANOATE 2 MILLIGRAM(S): 100 INJECTION INTRAMUSCULAR at 23:16

## 2017-01-01 RX ADMIN — Medication 50 MILLIGRAM(S): at 06:12

## 2017-01-01 RX ADMIN — SODIUM CHLORIDE 75 MILLILITER(S): 9 INJECTION, SOLUTION INTRAVENOUS at 17:27

## 2017-01-01 RX ADMIN — Medication 40 MILLIGRAM(S): at 13:06

## 2017-01-01 RX ADMIN — Medication 25 MICROGRAM(S): at 05:17

## 2017-01-01 RX ADMIN — Medication 81 MILLIGRAM(S): at 12:42

## 2017-01-01 RX ADMIN — PIPERACILLIN AND TAZOBACTAM 25 GRAM(S): 4; .5 INJECTION, POWDER, LYOPHILIZED, FOR SOLUTION INTRAVENOUS at 05:48

## 2017-01-01 RX ADMIN — MORPHINE SULFATE 1 MILLIGRAM(S): 50 CAPSULE, EXTENDED RELEASE ORAL at 12:30

## 2017-01-01 RX ADMIN — ATORVASTATIN CALCIUM 80 MILLIGRAM(S): 80 TABLET, FILM COATED ORAL at 21:30

## 2017-01-01 RX ADMIN — AMIODARONE HYDROCHLORIDE 400 MILLIGRAM(S): 400 TABLET ORAL at 22:07

## 2017-01-01 RX ADMIN — Medication 10 MG/HR: at 12:35

## 2017-01-01 RX ADMIN — AMIODARONE HYDROCHLORIDE 618 MILLIGRAM(S): 400 TABLET ORAL at 08:00

## 2017-01-01 RX ADMIN — Medication 0.25 MILLIGRAM(S): at 10:28

## 2017-01-01 RX ADMIN — PANTOPRAZOLE SODIUM 40 MILLIGRAM(S): 20 TABLET, DELAYED RELEASE ORAL at 06:20

## 2017-01-01 RX ADMIN — Medication 81 MILLIGRAM(S): at 11:35

## 2017-01-01 RX ADMIN — Medication 0.25 MILLIGRAM(S): at 21:52

## 2017-01-01 RX ADMIN — PIPERACILLIN AND TAZOBACTAM 25 GRAM(S): 4; .5 INJECTION, POWDER, LYOPHILIZED, FOR SOLUTION INTRAVENOUS at 06:42

## 2017-01-01 RX ADMIN — AMIODARONE HYDROCHLORIDE 400 MILLIGRAM(S): 400 TABLET ORAL at 21:22

## 2017-01-01 RX ADMIN — HALOPERIDOL DECANOATE 2 MILLIGRAM(S): 100 INJECTION INTRAMUSCULAR at 05:49

## 2017-01-01 RX ADMIN — MEMANTINE HYDROCHLORIDE 10 MILLIGRAM(S): 10 TABLET ORAL at 17:01

## 2017-01-01 RX ADMIN — Medication 25 MICROGRAM(S): at 04:51

## 2017-01-01 RX ADMIN — PIPERACILLIN AND TAZOBACTAM 25 GRAM(S): 4; .5 INJECTION, POWDER, LYOPHILIZED, FOR SOLUTION INTRAVENOUS at 21:57

## 2017-01-01 RX ADMIN — QUETIAPINE FUMARATE 12.5 MILLIGRAM(S): 200 TABLET, FILM COATED ORAL at 07:49

## 2017-01-01 RX ADMIN — Medication 5 MILLIGRAM(S): at 12:30

## 2017-01-01 RX ADMIN — Medication 25 MILLIGRAM(S): at 11:42

## 2017-01-01 RX ADMIN — ENOXAPARIN SODIUM 60 MILLIGRAM(S): 100 INJECTION SUBCUTANEOUS at 11:54

## 2017-01-19 NOTE — ED ADULT NURSE NOTE - ED STAT RN HANDOFF DETAILS
Report given to RICK gauthier. Patient stable at this time with cardizem gtt infusing @10ml/hr. Pt continues to be hypotensive regardless of fourth NS bolus - Admitting MD aware. ICU consult to be called if pressure does not increase. HR between 140-150bpm

## 2017-01-19 NOTE — ED PROVIDER NOTE - CHPI ED SYMPTOMS NEG
no syncope/no nausea/no diaphoresis/no fever/no vomiting/no dizziness/no shortness of breath/no chills/no back pain

## 2017-01-19 NOTE — ED PROVIDER NOTE - CARE PLAN
Principal Discharge DX:	New onset atrial fibrillation  Secondary Diagnosis:	Hyperlipemia  Secondary Diagnosis:	HTN (hypertension) Principal Discharge DX:	New onset atrial fibrillation  Secondary Diagnosis:	Dehydration  Secondary Diagnosis:	Pneumonia of both lower lobes due to infectious organism Principal Discharge DX:	Severe sepsis  Secondary Diagnosis:	Dehydration  Secondary Diagnosis:	Pneumonia of both lower lobes due to infectious organism

## 2017-01-19 NOTE — H&P ADULT. - HISTORY OF PRESENT ILLNESS
89 y.o. M with PMHx of HTN, HLD, ?arryhtmia ("skipped beat") presenting with cough x5 days. History obtained primarily from  at bedside as pt poor historian 2/2 dementia. As per , pt developed cough on Sunday which became progressively worse, productive of large amounts of sputum associated with chest pain. Cough worsened over the course of past few days so  decided to take pt to PCP office, Dr. Smith. Pt was found to be in rapid a fib with rates in 170s and was sent to ER. Pt has been having poor PO intake over past few days, limited to piece of toast and some ice cream.  also reports pt has not been acting like herself but pt has been deteriorating cognitively over past year. Pt denies fevers, chills, n/v/d/c, abd pain, dysuria, SOB, CP, sick contacts though unreliable. Pt had flu and pneumonia vaccine.     In ED, vitals significant for HR in 170s now in 140s, hypotensive 88/45 now improved to 100/45  Labs significant for wbc: 20.2, K: 3.1, bun/cr: 50/2.30, INR: 1.53   CXR: bibasilar PNA, ?deviated trachea   Given Azithro and Rocephin, 1L NS bolus x2, Cardizem 10mg IVP x2 followed by cardizem drip at 5cc 89 y.o. F with PMHx of HTN, HLD, ?arrythmia ("skipped beat") presenting with cough x5 days. History obtained primarily from  at bedside as pt poor historian 2/2 dementia. As per , pt developed cough on Sunday which became progressively worse, productive of large amounts of sputum associated with chest pain. Cough worsened over the course of past few days so  decided to take pt to PCP office, Dr. Smith. Pt was found to be in rapid a fib with rates in 170s and was sent to ER. Pt has been having poor PO intake over past few days, limited to piece of toast and some ice cream.  also reports pt has not been acting like herself but pt has been deteriorating cognitively over past year. Pt denies fevers, chills, n/v/d/c, abd pain, dysuria, SOB, CP, sick contacts though unreliable. Pt had flu and pneumonia vaccine.     In ED, vitals significant for HR in 170s now in 140s, hypotensive 88/45 now improved to 100/45  Labs significant for wbc: 20.2, K: 3.1, bun/cr: 50/2.30, INR: 1.53   CXR: bibasilar PNA, ?deviated trachea   Given Azithro and Rocephin, 1L NS bolus x2, Cardizem 10mg IVP x2 followed by cardizem drip at 5cc

## 2017-01-19 NOTE — H&P ADULT. - ATTENDING COMMENTS
89 yo f pmh stated above being admitted for sepsis 2/2 to b/l PNA and rapid afib with RVR.  Pt currently on cardizem drip, hypotensive, and has an elevated lactate, will consult ICU if patient doesn't improve on fluids.  Continue abx rocephin and zithromax.

## 2017-01-19 NOTE — H&P ADULT. - PROBLEM SELECTOR PLAN 1
-(+) leukocytosis, tachypnea, tachycardia  -likely 2/2 bibasilar PNA, confirmed on CXR   -continue with rocephin and azithro for likely CAP   -continue with IVF   -f/u lactate  -f/u urine and blood cx

## 2017-01-19 NOTE — ED PROVIDER NOTE - ENMT, MLM
Airway patent, Nasal mucosa clear. Mouth with dry mucosa. Throat has no vesicles, no oropharyngeal exudates and uvula is midline. lips dry

## 2017-01-19 NOTE — ED ADULT NURSE REASSESSMENT NOTE - NS ED NURSE REASSESS COMMENT FT1
Pt received lying on stretcher with frequent cough, sob, tachypnea.  at bedside. Pt hr persists >140, discussed with Dr. Neal, cardizem drip increased to 15mg/hr, infusing to right arm, nss bolus infusing to iv left arm. Continuing to hold in ED for further ev`al

## 2017-01-19 NOTE — ED PROVIDER NOTE - MEDICAL DECISION MAKING DETAILS
89yo F from PMD office with 3 days dec PO intake, cough and rapid HR, IVFs, rate control, labs, Magalys, CXR, EKG, Cardiology consult, r/o pneumonia, admit

## 2017-01-19 NOTE — ED PROVIDER NOTE - CONSTITUTIONAL, MLM
normal... Well appearing, well nourished, awake, alert, oriented to person, and in mild apparent distress.

## 2017-01-19 NOTE — H&P ADULT. - PROBLEM SELECTOR PLAN 2
-likely 2/2 dehydration and sepsis   -continue with IVF hydration   -unknown baseline  -avoid nephrotoxic meds  -will obtain bladder scan as suprapubic region slightly distended   -if no improvement of bun/cr consider renal sono and lytes

## 2017-01-19 NOTE — ED PROVIDER NOTE - OBJECTIVE STATEMENT
89yo F biba from pmd office ([facility]), pt saw dr waldrop, pt found to have rapid HR 170s.  provides history, st pt with mild cough x 3 days and decreased eating and drinking x 3 days. no cp, resp distress, n/v/d, abd pain, fever, chills, back pain, palpitations  cards=[place] 89yo F biba from pmd office ([facility]), pt saw dr waldrop, pt found to have rapid HR 170s.  provides history, st pt with mild cough x 3 days and decreased eating and drinking x 3 days. no cp, resp distress, n/v/d, abd pain, fever, chills, back pain, palpitations  cards=[facility]  ems monitor run sheet reveals tachycardia ~170s ?svt vs rapid afib

## 2017-01-19 NOTE — ED PROVIDER NOTE - PROGRESS NOTE DETAILS
d/w dr kelley, aware of pt status, st dr pritchard will eval pt. Cardiazem 10 mg IV slow   /80 cardizem 10 mg IV slow.  HR 140s d/w dr pritchard cards, aware of pt status, st pt may have hx of afib. st pt dehydrated, administer IVFs, admit and will see pt tonight d/w dr headley hospitalist, aware of pt status, cardizem infusion, will admit to telemetry, and reeval pt for possible upgrade if response to treatment not initially sufficient for telemetry

## 2017-01-19 NOTE — H&P ADULT. - PROBLEM SELECTOR PLAN 4
-as per , no hx of a fib in past but hx of "skipped beats"  -started on cardizem drip, currently at 10cc, titrate as allowed according to BP   -f/u TSH, echo -as per , no hx of a fib in past but hx of "skipped beats"  -started on cardizem drip, currently at 10cc, titrate as allowed according to BP   -f/u TSH, echo  -full dose lovenox (renally dosed) x1

## 2017-01-19 NOTE — ED ADULT NURSE NOTE - OBJECTIVE STATEMENT
Pt BIBA with rapid HR of 178bpm upon arrival. Per EMS HR went up to 200's. Rapid AFIB on EKG - Cardizem 20mg IVP administered, now started on cardizem gtt @10ml/hr. 20g to Left AC#20 from EMS - Right AC#20 placed in ED. IVF in progress for hypotension. ABT initiated per MD orders. Rectal temp of 98.3. Productive cough with green sputum noted. O2 @6L viA NC. Chest xray done. Labs sent. HR being monitored closely.Straight cath for urine done.

## 2017-01-19 NOTE — ED PROVIDER NOTE - SECONDARY DIAGNOSIS.
Hyperlipemia HTN (hypertension) Dehydration Pneumonia of both lower lobes due to infectious organism

## 2017-01-19 NOTE — H&P ADULT. - ASSESSMENT
89 y.o. M with PMHx of HTN, HLD, ?arryhtmia ("skipped beat") admitted with sepsis likely 2/2 bibiasilar PNA. 89 y.o. M with PMHx of HTN, HLD, ?arryhtmia ("skipped beat") admitted with sepsis likely 2/2 bibiasilar PNA, a fib with rapid RVR, r/o ACS, TRACEY.

## 2017-01-19 NOTE — H&P ADULT. - PROBLEM SELECTOR PLAN 3
-trops elevated at 3.3   -unable to appreciate any ST elevations or depressions on EKG in setting of rapid heart rate  -?demand ischemia from rapid a fib, though enzymes are considerably elevated  -continue to trend   -pt received full dose ASA in ED  -cardio-Dr. Bonilla consulted

## 2017-01-20 NOTE — PROVIDER CONTACT NOTE (EICU) - RECOMMENDATIONS
- check respiratory viral panel, sputum culture  - serial cardiac enzymes  - 2D echo  - aspirin 81mg

## 2017-01-20 NOTE — PATIENT PROFILE ADULT. - VISION (WITH CORRECTIVE LENSES IF THE PATIENT USUALLY WEARS THEM):
Partially impaired: cannot see medication labels or newsprint, but can see obstacles in path, and the surrounding layout; can count fingers at arm's length/glassess for reading

## 2017-01-20 NOTE — PROVIDER CONTACT NOTE (EICU) - ASSESSMENT
- on azithromycin, ceftriaxone  - currently receiving digoxin load and on cardizem drip  - already placed on midodrine for BP support

## 2017-01-20 NOTE — DIETITIAN INITIAL EVALUATION ADULT. - PROBLEM SELECTOR PLAN 4
-as per , no hx of a fib in past but hx of "skipped beats"  -started on cardizem drip, currently at 10cc, titrate as allowed according to BP   -f/u TSH, echo  -full dose lovenox (renally dosed) x1

## 2017-01-20 NOTE — DIETITIAN INITIAL EVALUATION ADULT. - NS AS NUTRI DX OTHER
Will remain available for diet initiation. Consider SLP evaluation to assess swallowing status & appropriate diet texture/liquid consistency. Rec MVI daily.

## 2017-01-20 NOTE — PROVIDER CONTACT NOTE (EICU) - SITUATION
89F PMH dementia, HTN, hyperlipidemia, hypothyroidism presents with worsening cough x 3 - 5 days, afebrile. Went to see PMD and was found to be in a-fib RVR HR 170s and sent to ER for further management and evaluation. Labs with leukocytosis WBC 20.2, Cr 2.30, lactate 2.8, trop 3.37, proBNP 34910. CXR with bibasilar opacities. Hypotensive in ER BP 70/30s given 4L IVF bolus. Placed on cardizem drip for a-fib RVR. Admitted to ICU for sepsis, a-fib RVR, pneumonia, TRACEY, r/o ACS/NSTEMI.

## 2017-01-20 NOTE — SWALLOW BEDSIDE ASSESSMENT ADULT - ASR SWALLOW ASPIRATION MONITOR
change of breathing pattern/fever/oral hygiene/pneumonia/position upright (89Y)/throat clearing/cough/gurgly voice/upper respiratory infection

## 2017-01-20 NOTE — SWALLOW BEDSIDE ASSESSMENT ADULT - COMMENTS
89 y/o woman with h/o dementia admitted with bibaslar PNA seen for clinical swallow eval. Pt A+Oxname only. Pt trialed with puree, thin liquids via tsp. Pt with reduced oral grading, oral holding with latent a-p transport and suspected swallow delay, laryngeal excursion reduced with multiple (3-5) swallows per bolus suggestive of pharyngeal stasis and increasing risk of aspiration.

## 2017-01-26 NOTE — PHYSICAL THERAPY INITIAL EVALUATION ADULT - PERTINENT HX OF CURRENT PROBLEM, REHAB EVAL
89 y/o female adm 1/19 with cough and decreased PO intake. Pt at PCP office and with rapid AFIB and sent to ER. CXRL + bibasilar PNA. echo: EF 65%. rapid RVP negative.

## 2017-01-26 NOTE — PHYSICAL THERAPY INITIAL EVALUATION ADULT - ADDITIONAL COMMENTS
Pt lives with her spouse in a house with steps to the basement ( where she does her laundry). Pt ambulates  independently and was independent with ADLS. Information obtained from spouse at bedside.

## 2017-02-02 NOTE — DISCHARGE NOTE FOR THE EXPIRED PATIENT - HOSPITAL COURSE
89 y/o. F with PMHx of HTN, HLD, Paroxysmal Afib, profound dementia presented with cough for 5 days prior to admission. History obtained primarily from  at bedside as pt poor historian 2/2 dementia. As per , pt developed cough  which became progressively worse, productive of large amounts of sputum associated with chest pain. Cough worsened over the course of past few days so  decided to take pt to PCP office, Dr. Smith. Pt was found to be in rapid a fib with rates in 170s and was sent to ER. Pt has been having poor PO intake over past few days, limited to piece of toast and some ice cream.  also reported that pt had not been acting like herself but pt has been deteriorating cognitively over past year. Pt denies fevers, chills, n/v/d/c, abd pain, dysuria, SOB, CP, sick contacts though unreliable. Pt had flu and pneumonia vaccine.     In ED, vitals significant for HR in 170s now in 140s, hypotensive 88/45 now improved to 100/45  Labs significant for wbc: 20.2, K: 3.1, bun/cr: 50/2.30, INR: 1.53   CXR: bibasilar PNA, ?deviated trachea   Given Azithro and Rocephin, 1L NS bolus x2, Cardizem 10mg IVP x2 followed by cardizem drip at 5cc     Patient was admitted to ICU due to severe sepsis 2/2 aspiration PNA and new onset rapid afib. Had NSTEMI 2/2 demand ischemia from aspiration PNA and rapid afib. Patient was treated with IV abx for PNA. Patient was stabilized in the ICU and transferred to telemetry. Found to be influenza positive. Patient had copious secretions which were frequently suctioned. Repeat CXRs showed evidence of recurrent aspirations. Patient was constantly suctioned and consistently needed higher levels of oxygen to maintain saturation levels. Patient has been in significant respiratory distress due to constant accumulation of secretions. NGT was placed to provide feeds while reducing aspiration risk. On 2/2/2017 RRT was called due to patient desaturation to upto 76%-80% on 100% aerosol mask. Patient was suctioned bringing out copious sputum and clots. Given robinol to dry out secretions. Patient was DNR/DNI. Continued on max oxygen delivery via aerosol mask. Patient was in significant respiratory distress and given low dose of morphine. Patient continued desaturating and was found to be in asystole. Patient was pronounced dead based on cardiopulmonary criteria on 2/2/2017 at 0620.    Gen: unresponsive to verbal and painful stimuli  HEENT: pupils fixed and dilated  Cardio: absent heart sounds  Resp: absent breath sounds  Ext: cool to touch  Neuro: does not withdraw to painful stimuli, absence of corneal reflex    Time of Death: 0620 on 2/2/2017  Cause of Death: Sepsis 2/2 Recurrent Aspiration PNA due to profound dementia 89 y/o. F with PMHx of HTN, HLD, Paroxysmal Afib, profound dementia presented with cough for 5 days prior to admission. History obtained primarily from  at bedside as pt poor historian 2/2 dementia. As per , pt developed cough  which became progressively worse, productive of large amounts of sputum associated with chest pain. Cough worsened over the course of past few days so  decided to take pt to PCP office, Dr. Smith. Pt was found to be in rapid a fib with rates in 170s and was sent to ER. Pt has been having poor PO intake over past few days, limited to piece of toast and some ice cream.  also reported that pt had not been acting like herself but pt has been deteriorating cognitively over past year. Pt denies fevers, chills, n/v/d/c, abd pain, dysuria, SOB, CP, sick contacts though unreliable. Pt had flu and pneumonia vaccine.     In ED, vitals significant for HR in 170s now in 140s, hypotensive 88/45 now improved to 100/45  Labs significant for wbc: 20.2, K: 3.1, bun/cr: 50/2.30, INR: 1.53   CXR: bibasilar PNA, ?deviated trachea   Given Azithro and Rocephin, 1L NS bolus x2, Cardizem 10mg IVP x2 followed by cardizem drip at 5cc     Patient was admitted to ICU due to severe sepsis 2/2 aspiration PNA and new onset rapid afib. Had NSTEMI 2/2 demand ischemia from aspiration PNA and rapid afib. Patient was treated with IV abx for PNA. Patient was stabilized in the ICU and transferred to telemetry. Found to be influenza positive. Patient had copious secretions which were frequently suctioned. Repeat CXRs showed evidence of recurrent aspirations. Patient was constantly suctioned and consistently needed higher levels of oxygen to maintain saturation levels. Patient has been in significant respiratory distress due to constant accumulation of secretions. NGT was placed to provide feeds while reducing aspiration risk. On 2/2/2017 RRT was called due to patient desaturation to upto 76%-80% on 100% aerosol mask. Patient was suctioned bringing out copious sputum and clots. Given robinol to dry out secretions. Patient was DNR/DNI. Continued on max oxygen delivery via aerosol mask. Patient was in significant respiratory distress and given low dose of morphine. Patient continued desaturating and was found to be in asystole. Patient was pronounced dead based on cardiopulmonary criteria on 2/2/2017 at 0620.    Gen: unresponsive to verbal and painful stimuli  HEENT: pupils fixed and dilated  Cardio: absent heart sounds  Resp: absent breath sounds  Ext: cool to touch  Neuro: does not withdraw to painful stimuli, absence of corneal reflex    Time of Death: 0620 on 2/2/2017  Cause of Death: Sepsis 2/2 Recurrent Aspiration PNA, +Influenza, profound dementia

## 2017-02-02 NOTE — PROVIDER CONTACT NOTE (CRITICAL VALUE NOTIFICATION) - TEST AND RESULT REPORTED:
Trop 3.145
Lactate 2.8
Platelet
WBC 40
blood c/s drawned 1/19 growth in aerobic bottle gram positive cocci and clusters and gram positive rods
lactate 2.9
lactate 2.8
Lactate 2.2

## 2017-02-02 NOTE — DISCHARGE NOTE FOR THE EXPIRED PATIENT - SECONDARY DIAGNOSIS.
Pneumonia of both lower lobes due to infectious organism New onset atrial fibrillation HTN (hypertension) Dementia Hypothyroid Hyperlipemia

## 2017-02-05 LAB
CULTURE RESULTS: SIGNIFICANT CHANGE UP
CULTURE RESULTS: SIGNIFICANT CHANGE UP
SPECIMEN SOURCE: SIGNIFICANT CHANGE UP
SPECIMEN SOURCE: SIGNIFICANT CHANGE UP

## 2017-02-06 DIAGNOSIS — I34.0 NONRHEUMATIC MITRAL (VALVE) INSUFFICIENCY: ICD-10-CM

## 2017-02-06 DIAGNOSIS — E03.9 HYPOTHYROIDISM, UNSPECIFIED: ICD-10-CM

## 2017-02-06 DIAGNOSIS — I48.0 PAROXYSMAL ATRIAL FIBRILLATION: ICD-10-CM

## 2017-02-06 DIAGNOSIS — R09.02 HYPOXEMIA: ICD-10-CM

## 2017-02-06 DIAGNOSIS — Z78.1 PHYSICAL RESTRAINT STATUS: ICD-10-CM

## 2017-02-06 DIAGNOSIS — E87.6 HYPOKALEMIA: ICD-10-CM

## 2017-02-06 DIAGNOSIS — R65.20 SEVERE SEPSIS WITHOUT SEPTIC SHOCK: ICD-10-CM

## 2017-02-06 DIAGNOSIS — I36.1 NONRHEUMATIC TRICUSPID (VALVE) INSUFFICIENCY: ICD-10-CM

## 2017-02-06 DIAGNOSIS — R41.0 DISORIENTATION, UNSPECIFIED: ICD-10-CM

## 2017-02-06 DIAGNOSIS — L89.151 PRESSURE ULCER OF SACRAL REGION, STAGE 1: ICD-10-CM

## 2017-02-06 DIAGNOSIS — I47.2 VENTRICULAR TACHYCARDIA: ICD-10-CM

## 2017-02-06 DIAGNOSIS — N18.3 CHRONIC KIDNEY DISEASE, STAGE 3 (MODERATE): ICD-10-CM

## 2017-02-06 DIAGNOSIS — F02.80 DEMENTIA IN OTHER DISEASES CLASSIFIED ELSEWHERE, UNSPECIFIED SEVERITY, WITHOUT BEHAVIORAL DISTURBANCE, PSYCHOTIC DISTURBANCE, MOOD DISTURBANCE, AND ANXIETY: ICD-10-CM

## 2017-02-06 DIAGNOSIS — J10.08 INFLUENZA DUE TO OTHER IDENTIFIED INFLUENZA VIRUS WITH OTHER SPECIFIED PNEUMONIA: ICD-10-CM

## 2017-02-06 DIAGNOSIS — J69.0 PNEUMONITIS DUE TO INHALATION OF FOOD AND VOMIT: ICD-10-CM

## 2017-02-06 DIAGNOSIS — Z66 DO NOT RESUSCITATE: ICD-10-CM

## 2017-02-06 DIAGNOSIS — R33.9 RETENTION OF URINE, UNSPECIFIED: ICD-10-CM

## 2017-02-06 DIAGNOSIS — I13.0 HYPERTENSIVE HEART AND CHRONIC KIDNEY DISEASE WITH HEART FAILURE AND STAGE 1 THROUGH STAGE 4 CHRONIC KIDNEY DISEASE, OR UNSPECIFIED CHRONIC KIDNEY DISEASE: ICD-10-CM

## 2017-02-06 DIAGNOSIS — N17.9 ACUTE KIDNEY FAILURE, UNSPECIFIED: ICD-10-CM

## 2017-02-06 DIAGNOSIS — I35.1 NONRHEUMATIC AORTIC (VALVE) INSUFFICIENCY: ICD-10-CM

## 2017-02-06 DIAGNOSIS — G93.40 ENCEPHALOPATHY, UNSPECIFIED: ICD-10-CM

## 2017-02-06 DIAGNOSIS — I21.4 NON-ST ELEVATION (NSTEMI) MYOCARDIAL INFARCTION: ICD-10-CM

## 2017-02-06 DIAGNOSIS — A41.9 SEPSIS, UNSPECIFIED ORGANISM: ICD-10-CM

## 2017-02-06 DIAGNOSIS — E86.0 DEHYDRATION: ICD-10-CM

## 2017-02-06 DIAGNOSIS — I50.33 ACUTE ON CHRONIC DIASTOLIC (CONGESTIVE) HEART FAILURE: ICD-10-CM

## 2017-02-06 DIAGNOSIS — G30.9 ALZHEIMER'S DISEASE, UNSPECIFIED: ICD-10-CM

## 2017-02-06 DIAGNOSIS — E87.2 ACIDOSIS: ICD-10-CM

## 2017-02-06 DIAGNOSIS — E78.5 HYPERLIPIDEMIA, UNSPECIFIED: ICD-10-CM

## 2018-04-25 NOTE — PATIENT PROFILE ADULT. - FUNCTIONAL LEVEL PRIOR: TOILETING
"Subjective:       Patient ID: Polly Medina is a 65 y.o. female.    Chief Complaint: Establish Care; Nausea (sx have been going on about 2 weeks. ); Dizziness; Fatigue; Urinary Tract Infection; and Back Pain    Patient presents to establish care.  She states she has had RLQ abdominal pain and thought it was due to pushing furniture. She now ahs a burning and pressure sensation. She states she has lower back pain. She has burning sensation  Radiating down to her legs. She admits to urinary frequency. She tried diet cranberry juice, which gave her mild relief. She has nausea, but no vomiting or fever. She admits to chills. She denies hematuria.     She has diabetes and is due for her blood sugar lab check. Her last labs were 6 months ago..       Urinary Tract Infection        Review of Systems    Objective:       Vitals:    04/25/18 1355   BP: 136/70   Pulse: 88   Temp: 98.3 °F (36.8 °C)   TempSrc: Oral   SpO2: 97%   Weight: 93.5 kg (206 lb 2.1 oz)   Height: 5' 2" (1.575 m)       Physical Exam   Constitutional: She appears well-developed and well-nourished. No distress.   HENT:   Head: Normocephalic and atraumatic.   Eyes: Conjunctivae are normal.   Cardiovascular: Normal rate, regular rhythm and normal heart sounds.  Exam reveals no gallop and no friction rub.    No murmur heard.  Pulmonary/Chest: Effort normal and breath sounds normal. No respiratory distress. She has no wheezes. She has no rales.   Abdominal: There is no CVA tenderness.   Musculoskeletal:        Lumbar back: She exhibits normal range of motion, no tenderness, no pain and no spasm.        Back:    Neurological: She is alert.   Skin: She is not diaphoretic.       Assessment:       1. Urinary tract infection without hematuria, site unspecified    2. Type 2 diabetes mellitus without complication, without long-term current use of insulin    3. Essential hypertension        Plan:       Polly was seen today for establish care, nausea, dizziness, " fatigue, urinary tract infection and back pain.    Diagnoses and all orders for this visit:    Urinary tract infection without hematuria, site unspecified  -     POCT URINE DIPSTICK WITHOUT MICROSCOPE  -     Urine culture  -     cephALEXin (KEFLEX) 500 MG capsule; Take 1 capsule (500 mg total) by mouth every 12 (twelve) hours.    Type 2 diabetes mellitus without complication, without long-term current use of insulin  -     Hemoglobin A1c; Future  -     Comprehensive metabolic panel; Future  -     Lipid panel; Future  -     Microalbumin/creatinine urine ratio; Future  -     CBC auto differential; Future  -     metFORMIN (GLUCOPHAGE) 500 MG tablet; Take 1 tablet (500 mg total) by mouth 2 (two) times daily with meals.    Essential hypertension  -     hydroCHLOROthiazide (HYDRODIURIL) 25 MG tablet; Take 1 tablet (25 mg total) by mouth once daily.  -     losartan (COZAAR) 100 MG tablet; Take 1 tablet (100 mg total) by mouth once daily.  -     potassium chloride (MICRO-K) 10 MEQ CpSR; Take 1 capsule (10 mEq total) by mouth once daily.            (0) independent

## 2022-03-23 NOTE — SWALLOW BEDSIDE ASSESSMENT ADULT - SPECIFY REASON(S)
NPO
Discussed return precautions at length, will follow up with pmd tomorrow. Parents will give Albuterol with spacer every 4 hours while awake for the next 2-3 days. Received decadron here and does not require further steroid unless indicated by pmd.     Asthma, Pediatric  Asthma is a long-term (chronic) condition that causes recurrent swelling and narrowing of the airways. The airways are the passages that lead from the nose and mouth down into the lungs. When asthma symptoms get worse, it is called an asthma flare. When this happens, it can be difficult for your child to breathe. Asthma flares can range from minor to life-threatening.    Asthma cannot be cured, but medicines and lifestyle changes can help to control your child's asthma symptoms. It is important to keep your child's asthma well controlled in order to decrease how much this condition interferes with his or her daily life.    What are the causes?  The exact cause of asthma is not known. It is most likely caused by family (genetic) inheritance and exposure to a combination of environmental factors early in life.    There are many things that can bring on an asthma flare or make asthma symptoms worse (triggers). Common triggers include:    Mold.  Dust.  Smoke.  Outdoor air pollutants, such as engine exhaust.  Indoor air pollutants, such as aerosol sprays and fumes from household .  Strong odors.  Very cold, dry, or humid air.  Things that can cause allergy symptoms (allergens), such as pollen from grasses or trees and animal dander.  Household pests, including dust mites and cockroaches.  Stress or strong emotions.  Infections that affect the airways, such as common cold or flu.    What increases the risk?  Your child may have an increased risk of asthma if:    He or she has had certain types of repeated lung (respiratory) infections.  He or she has seasonal allergies or an allergic skin condition (eczema).  One or both parents have allergies or asthma.    What are the signs or symptoms?  Symptoms may vary depending on the child and his or her asthma flare triggers. Common symptoms include:    Wheezing.  Trouble breathing (shortness of breath).  Nighttime or early morning coughing.  Frequent or severe coughing with a common cold.  Chest tightness.  Difficulty talking in complete sentences during an asthma flare.  Straining to breathe.  Poor exercise tolerance.    How is this diagnosed?  Asthma is diagnosed with a medical history and physical exam. Tests that may be done include:    Lung function studies (spirometry).  Allergy tests.    How is this treated?  Treatment for asthma involves:    Identifying and avoiding your child’s asthma triggers.  Medicines. Two types of medicines are commonly used to treat asthma:    Controller medicines. These help prevent asthma symptoms from occurring. They are usually taken every day.  Fast-acting reliever or rescue medicines. These quickly relieve asthma symptoms. They are used as needed and provide short-term relief.    Your child’s health care provider will help you create a written plan for managing and treating your child's asthma flares (asthma action plan). This plan includes:    A list of your child’s asthma triggers and how to avoid them.  Information on when medicines should be taken and when to change their dosage.    An action plan also involves using a device that measures how well your child’s lungs are working (peak flow meter). Often, your child’s peak flow number will start to go down before you or your child recognizes asthma flare symptoms.    Follow these instructions at home:  General instructions     Give over-the-counter and prescription medicines only as told by your child’s health care provider.  Use a peak flow meter as told by your child’s health care provider. Record and keep track of your child's peak flow readings.  Understand and use the asthma action plan to address an asthma flare. Make sure that all people providing care for your child:    Have a copy of the asthma action plan.  Understand what to do during an asthma flare.  Have access to any needed medicines, if this applies.    Trigger Avoidance     Once your child’s asthma triggers have been identified, take actions to avoid them. This may include avoiding excessive or prolonged exposure to:    Dust and mold.    Dust and vacuum your home 1–2 times per week while your child is not home. Use a high-efficiency particulate arrestance (HEPA) vacuum, if possible.  Replace carpet with wood, tile, or vinyl karrie, if possible.  Change your heating and air conditioning filter at least once a month. Use a HEPA filter, if possible.  Throw away plants if you see mold on them.  Clean bathrooms and joycelyn with bleach. Repaint the walls in these rooms with mold-resistant paint. Keep your child out of these rooms while you are cleaning and painting.  Limit your child's plush toys or stuffed animals to 1–2. Wash them monthly with hot water and dry them in a dryer.  Use allergy-proof bedding, including pillows, mattress covers, and box spring covers.  Wash bedding every week in hot water and dry it in a dryer.  Use blankets that are made of polyester or cotton.    Pet dander. Have your child avoid contact with any animals that he or she is allergic to.  Allergens and pollens from any grasses, trees, or other plants that your child is allergic to. Have your child avoid spending a lot of time outdoors when pollen counts are high, and on very windy days.  Foods that contain high amounts of sulfites.  Strong odors, chemicals, and fumes.  Smoke.    Do not allow your child to smoke. Talk to your child about the risks of smoking.  Have your child avoid exposure to smoke. This includes campfire smoke, forest fire smoke, and secondhand smoke from tobacco products. Do not smoke or allow others to smoke in your home or around your child.    Household pests and pest droppings, including dust mites and cockroaches.  Certain medicines, including NSAIDs. Always talk to your child’s health care provider before stopping or starting any new medicines.    Making sure that you, your child, and all household members wash their hands frequently will also help to control some triggers. If soap and water are not available, use hand .    Contact a health care provider if:  Image   Your child has wheezing, shortness of breath, or a cough that is not responding to medicines.  The mucus your child coughs up (sputum) is yellow, green, gray, bloody, or thicker than usual.  Your child’s medicines are causing side effects, such as a rash, itching, swelling, or trouble breathing.  Your child needs reliever medicines more often than 2–3 times per week.  Your child's peak flow measurement is at 50–79% of his or her personal best (yellow zone) after following his or her asthma action plan for 1 hour.  Your child has a fever.  Get help right away if:  Your child's peak flow is less than 50% of his or her personal best (red zone).  Your child is getting worse and does not respond to treatment during an asthma flare.  Your child is short of breath at rest or when doing very little physical activity.  Your child has difficulty eating, drinking, or talking.  Your child has chest pain.  Your child’s lips or fingernails look bluish.  Your child is light-headed or dizzy, or your child faints.  Your child who is younger than 3 months has a temperature of 100°F (38°C) or higher.  This information is not intended to replace advice given to you by your health care provider. Make sure you discuss any questions you have with your health care provider.

## 2023-08-30 NOTE — ED ADULT NURSE NOTE - TOBACCO USE
Provider Staff:  Action required for this patient     Please see care gap opportunities below in Care Due Message.    Thanks!  [facility]     Appointments      Date Provider   Last Visit   7/17/2023 Marc Sands MD   Next Visit   1/18/2024 Marc Sands MD     Refill Decision Note   Shirley Gilford  is requesting a refill authorization.  Brief Assessment and Rationale for Refill:  Approve     Medication Therapy Plan:         Comments:     Note composed:11:29 AM 08/30/2023            Never smoker

## 2023-09-19 NOTE — SWALLOW BEDSIDE ASSESSMENT ADULT - POSITIONING
Pt seen in [facility]. Venofer IVPB and NaCL flush bag administered and tolerated well with no adverse reactions. No signs of distress.  
upright (90 degrees)

## 2023-09-24 NOTE — PATIENT PROFILE ADULT. - INFLUENZA IMMUNIZATION DATE:
Patient brought by ambulance from home as reported complaining of lower back pain radiating to left side and left arm rash october 2016

## 2024-10-04 NOTE — H&P ADULT. - PSYCHIATRIC
No abnormalities noted negative Affect and characteristics of appearance, verbalizations, behaviors are appropriate

## 2024-12-11 NOTE — PATIENT PROFILE ADULT. - RESOURCE ASSISTANCE NEEDED TO OBTAIN FOOD
Kristin Martines is a 78 y.o. female on day 3 of admission presenting with Atrial fibrillation with RVR (Multi).      Subjective     Pt awake, conversant, and A&O x 3. Pt's heart rate was in the 90's upon examination. Pt stated that she has been experiencing SOB and labored breathing. Today's labs read a glucose of 89, sodium of 138, WBC of 15.2, RBC of 3.97, hemoglobin of 11.8, and hematocrit of 37.6. Cardiology was consulted and provided their input. Still awaiting the results of the echocardiogram which will help determine if a heart cath is needed. Pt's metoprolol has been increased to 100 mg to help better manage her atrial fibrillation. Pt is currently taking Xarelto 20 mg daily for protection against thromboembolism. As per pharmacy - her levofloxacin 750 mg IV q24hrs was adjusted to 750 mg IV q48hrs to better treat her pneumonia and is better for her renal function. Plan discussed with interdisciplinary team, continue current and repeat labs in the AM.     Discharge planning discussed with patient and care team. Therapy evaluations ordered.  Patient aware and agreeable to current plan, continue plan as above.     I spent a total of 50 minutes on the date of the service which included preparing to see the patient, face-to-face patient care, completing clinical documentation, obtaining and/or reviewing separately obtained history, performing a medically appropriate examination, counseling and educating the patient/family/caregiver, ordering medications, tests, or procedures, communicating with other HCPs (not separately reported), independently interpreting results (not separately reported), communicating results to the patient/family/caregiver, and care coordination (not separately reported).          Objective     Last Recorded Vitals  /90   Pulse 71   Temp 36.2 °C (97.2 °F)   Resp 19   Wt 73.8 kg (162 lb 11.2 oz)   SpO2 96%   Intake/Output last 3 Shifts:  No intake or output data in the 24 hours  ending 12/11/24 1629      Admission Weight  Weight: 72.6 kg (160 lb) (12/08/24 1000)    Daily Weight  12/09/24 : 73.8 kg (162 lb 11.2 oz)    Image Results  Cardiac Catheterization Procedure     [facility], Cath Lab, [address] [place] [id number]    Cardiovascular Catheterization Report    Patient Name:      DOUGLAS MELENDEZ      Performing Physician:  [id number] Elizabeth MD  Study Date:        12/10/2024            Verifying Physician:   Simone Elizabeth MD  MRN/PID:           [id number]              Cardiologist/Co-Scrub:  Accession#:        [id number]          Ordering Provider:     [id number] JAZ MORENO  Date of Birth/Age: 1946 / 78 years Cardiologist:  Gender:            F                     Fellow:  Encounter#:        [id number]            Surgeon:       Study: Left Heart Cath with Grafts       Indications:  DOUGLAS MELENDEZ is a 78 year old female who presents with dyslipidemia, hypertension, prior coronary artery bypass graft surgery and a chest pain assessment of atypical angina. NSTE - ACS.     Appropriate Use Criteria:  Non ST elevation myocardial infarction with high risk score; AUC score = 9.     Procedure Description Comments:  Informed consent was obtained.  Patient was brought to the Cath Lab and prepped and draped in usual fashion.  Under moderate sedation and after giving 2% local lidocaine, right femoral arterial access was obtained using micropuncture technique and a 6 Vietnamese sheath was placed in.  Using JL 4 and JR4 catheters, coronary angiography and left heart catheterization and assessment of bypass grafts were done.  Manual pressure was restarted hemostasis in right groin access site.  Patient tolerated the procedure well and transferred to recovery area for  further care and monitoring.  Report was given to Dr. Castro.     Coronary Angiography:  The coronary circulation is right dominant.     Coronary Angiography Comments:  Luminal irregularities throughout left main    Proximal to mid LAD multiple 30 to 50% lesions. Luminal irregularities in the rest of the vessel. No competitive flow in LAD    Luminal irregularities throughout left circumflex    Mid RCA  with left-to-right collaterals    Patent SVG sequential graft to PDA and PLV    Patent SVG to diagonal branch     Atretic LIMA    Right dominant coronary artery system     LVEDP 7 mmHg without significant gradient across aortic valve.       Hemo Personnel:  +-----------------------+---------+  Name                   Duty       +-----------------------+---------+  Lambert Elizabeth MDPROC MD 1  +-----------------------+---------+       Fluoroscopy Time:  +--------------------------+--------+  X-Ray Summary Fluoro Time:5.10 min  +--------------------------+--------+       +----------+--------+  Contrast: Dose:     +----------+--------+  Omnipaque:55.00 ml  +----------+--------+       Hemodynamic Pressures:     +----+---------------+----------+-------------+--------------+-------+---------+  Site   Date Time   Phase NameSystolic mmHgDiastolic mmHgED mmHgMean mmHg  +----+---------------+----------+-------------+--------------+-------+---------+    AO     12/10/2024   O2 REST          173            81             116           2:27:43 PM                                                       +----+---------------+----------+-------------+--------------+-------+---------+    LV     12/10/2024   O2 REST          141             4     10                    2:30:38 PM                                                       +----+---------------+----------+-------------+--------------+-------+---------+    LV     12/10/2024   O2 REST          148             2       7                    2:30:43 PM                                                       +----+---------------+----------+-------------+--------------+-------+---------+   LVp     12/10/2024   O2 REST          136             3      8                    2:30:46 PM                                                       +----+---------------+----------+-------------+--------------+-------+---------+   AOp     12/10/2024   O2 REST          169            86             121           2:30:51 PM                                                       +----+---------------+----------+-------------+--------------+-------+---------+    AO     12/10/2024   O2 REST          179            88             125           2:31:05 PM                                                       +----+---------------+----------+-------------+--------------+-------+---------+    AO     12/10/2024   O2 REST          186            88             125           2:33:58 PM                                                       +----+---------------+----------+-------------+--------------+-------+---------+    AO     12/10/2024   O2 REST          198            94             134           2:37:03 PM                                                       +----+---------------+----------+-------------+--------------+-------+---------+       Complications:  No in-lab complications observed.     Cardiac Cath Post Procedure Notes:  Post Procedure Diagnosis: Coronary artery disease as above.  Blood Loss:               Estimated blood loss during the procedure was 5 mls.  Specimens Removed:        Number of specimen(s) removed: none.       Recommendations:  Maximize medical therapy.  Agressive risk factor modification efforts.  Follow-up with cardiology clinic.  Medical management of coronary artery  "disease.    ____________________________________________________________________________________  CONCLUSIONS:   1. Luminal irregularities throughout left main            Proximal to mid LAD multiple 30 to 50% lesions. Luminal irregularities in the rest of the vessel. No competitive flow in LAD            Luminal irregularities throughout left circumflex            Mid RCA  with left-to-right collaterals            Patent SVG sequential graft to PDA and PLV            Patent SVG to diagonal branch            Atretic LIMA            Right dominant coronary artery system            LVEDP 7 mmHg without significant gradient across aortic valve.    ICD 10 Codes:  Non ST elevation (NSTEMI) myocardial infarction-I21.4     CPT Codes:  Left Heart Cath Bypass Graft w ventriculography and coronary angio([facility])-93686; Moderate Sedation Services 2nd additional 15 minutes patient >5 years-16318; Moderate Sedation Services 1st additional 15 minutes patient >5 years-60928; Moderate Sedation Services 3rd additional 15 minutes patient >5 years-21046     [id number] Lambert Elizabeth MD  Performing Physician  Electronically signed by [id number] Lambert Elizabeth MD on 12/10/2024 at 4:22:26  PM         ** Final **      Physical Exam    Relevant Results               Assessment/Plan                  Assessment & Plan  Atrial fibrillation with RVR (Multi)    Elevated troponin          Jemal Helton MD   Physician  Internal Medicine     H&P      Addendum     Date of Service: 12/8/2024  1:37 PM     Addendum       Expand All Collapse All    History Of Present Illness  Kristin Martines is a 78 y.o. female with a past medical history of diastolic heart failure, CABG x 4 in 2019, HLD, palpitations, GERD presenting with concern for generalized weakness.  Patient states that since last Tuesday, she has been sick with a “chest infection.\"  She states that initially, she was utilizing Mucinex, Tylenol, and elderberry, but this was not doing the " dale.  She notified Dr. Helton of her symptoms, and was subsequently prescribed Medrol Dosepak and levofloxacin.  Patient states she took only 1 dose of each yesterday, and then decided to present to the emergency department for further evaluation.  She states she feels warm, but when she took her temperature was 99.3 °F.  She is also endorsing a cough productive of green mucus, green rhinorrhea, a heavy feeling in her chest, occasional palpitations that started with her infection, shortness of breath.  She states the heaviness in her chest is located the center, but denies active chest pain.  She states that just started this morning and she has never experienced it before.  She states it gets worse with cough.  She otherwise denies dizziness, syncope, leg swelling, nausea/vomiting, diarrhea, urinary symptoms, history of DVT/PE, hemoptysis, tobacco use, history of malignancy, recent surgery/recent travel/long car rides.  She states she does not use caffeine, and drinks everything caffeine free.  She does states she has been under some stress lately with work, as she works on commission.  Does not smoke or drink alcohol.     ED course: On arrival, patient's BP 67/46, heart rate 68, respirations 18, afebrile, saturating 96% on room air.  BP did go up to 114/74.  Heart rate now 140.  Respirations 26.  Labs and imaging performed, revealing creatinine 1.09 and BUN 18 (0.87 and 16 on 11/16).  Calcium 10.9.  Lactate WNL.  BNP elevated at 470.  Initial troponin 12, delta troponin 61.  TSH WNL.  White count elevated at 20.4.  Blood cultures ordered and pending.  Chest x-ray shows questionable retrocardiac infiltrate.  Patient given 5 mg Lopressor, 250 mcg digoxin.  IV Levaquin initiated.  CT angio head and neck and CT head ordered and pending.  Urinalysis ordered and pending.  Patient to be admitted inpatient under Dr. Helton.     Past Medical History  Medical History        Past Medical History:   Diagnosis Date     Encounter for screening for malignant neoplasm of vagina       Vaginal Pap smear    Other specified noninflammatory disorders of vulva and perineum       Vulval lesion    Personal history of other medical treatment 11/01/2019     H/O bone density study    Personal history of other medical treatment       H/O mammogram            Surgical History  Surgical History         Past Surgical History:   Procedure Laterality Date    FOOT SURGERY   12/02/2019     Foot Surgery    OTHER SURGICAL HISTORY   08/09/2021     Cardiac catheterization    OTHER SURGICAL HISTORY   04/29/2020     Coronary artery bypass graft    OTHER SURGICAL HISTORY   12/02/2019     Wrist Surgery    OTHER SURGICAL HISTORY   12/02/2019     Biopsy Vulvar            Social History  She reports that she has never smoked. She has never used smokeless tobacco. She reports current alcohol use. She reports that she does not use drugs.     Family History  Family History          Family History   Problem Relation Name Age of Onset    Hypertension Mother        Heart attack Father        Asthma Other family hx      Other (cardiac disorder) Other family hx      Hypertension Other family hx              Allergies  Amoxicillin-pot clavulanate, Cephalexin, Ciprofloxacin, Codeine, Erythromycin base, Esomeprazole magnesium, Ezetimibe, Oxycodone-acetaminophen, and Sulfamethoxazole-trimethoprim     Review of Systems  Negative except as stated above in HPI     Physical Exam  Constitutional:       General: She is not in acute distress.     Appearance: Normal appearance. She is not toxic-appearing.      Comments: Patient is alert and oriented x 3 upon my arrival.  She is in normal sinus rhythm.  She frequently stops our interview to cough, but is not displaying conversational dyspnea.   HENT:      Head: Normocephalic and atraumatic.      Nose: Congestion and rhinorrhea present.      Mouth/Throat:      Mouth: Mucous membranes are moist.      Pharynx: Oropharynx is clear.  "  Eyes:      Extraocular Movements: Extraocular movements intact.      Conjunctiva/sclera: Conjunctivae normal.      Pupils: Pupils are equal, round, and reactive to light.   Cardiovascular:      Rate and Rhythm: Normal rate and regular rhythm.      Pulses: Normal pulses.   Pulmonary:      Effort: Pulmonary effort is normal.      Breath sounds: Normal breath sounds.   Abdominal:      General: Abdomen is flat. Bowel sounds are normal.      Palpations: Abdomen is soft.      Tenderness: There is no abdominal tenderness.   Musculoskeletal:         General: Normal range of motion.      Cervical back: Normal range of motion.      Right lower leg: No edema.      Left lower leg: No edema.   Skin:     General: Skin is warm and dry.   Neurological:      General: No focal deficit present.      Mental Status: She is alert and oriented to person, place, and time. Mental status is at baseline.   Psychiatric:         Mood and Affect: Mood normal.         Behavior: Behavior normal.         Thought Content: Thought content normal.            Last Recorded Vitals  Blood pressure 97/74, pulse 69, temperature 36 °C (96.8 °F), resp. rate (!) 40, height 1.626 m (5' 4\"), weight 72.6 kg (160 lb), SpO2 97%.     Relevant Results     Scheduled medications    Scheduled Medications   ALPRAZolam, 0.25 mg, oral, Nightly  aspirin, 81 mg, oral, Daily  cycloSPORINE, 1 drop, Both Eyes, BID  doxazosin, 4 mg, oral, Nightly  famotidine, 40 mg, oral, Nightly  heparin (porcine), 5,000 Units, subcutaneous, q8h  levoFLOXacin, 750 mg, intravenous, q24h  levothyroxine, 75 mcg, oral, Once per day on Sunday Tuesday Wednesday Thursday Saturday  metoprolol succinate XL, 75 mg, oral, BID  perflutren lipid microspheres, 0.5-10 mL of dilution, intravenous, Once in imaging  perflutren protein A microsphere, 0.5 mL, intravenous, Once in imaging  polyethylene glycol, 17 g, oral, Daily  sertraline, 50 mg, oral, Daily  sulfur hexafluoride microsphr, 2 mL, intravenous, " Once in imaging  valsartan, 320 mg, oral, Daily         Continuous medications  Continuous Medications         PRN medications  PRN Medications   PRN medications: acetaminophen **OR** acetaminophen **OR** acetaminophen, metoprolol, oxygen        CT angio head and neck w and wo IV contrast     Result Date: 12/8/2024  Interpreted By:  Mary Grace Byrd, STUDY: CT ANGIO HEAD AND NECK W AND WO IV CONTRAST;  12/8/2024 12:03 pm   INDICATION: Signs/Symptoms:vertigo, new a.fib, initial screening for cerebellar syndrome.     COMPARISON: Same day unenhanced head CT which is reported separately.   ACCESSION NUMBER(S): [id number]   ORDERING CLINICIAN: LUCIA ECHEVERRIA   TECHNIQUE: Subsequently, 90 ML of Omnipaque 350 was administered intravenously and axial images of the head and neck were acquired.  Coronal, sagittal, and 3-D reconstructions were provided for review.   3D reconstructions were performed utilizing independent software.   FINDINGS:     CTA HEAD FINDINGS:   Anterior circulation: There are diffuse calcifications of the intracranial segments of the internal carotid arteries. There appears to be at least mild narrowing bilaterally. There is subtle focal outpouching projecting inferomedially from the communicating segment of the right ICA measuring less than 2 mm on image 115 of 430. The proximal anterior cerebral arteries are patent. There is subtle narrowing of the proximal M1 segment of the right MCA. There is mild irregularity and narrowing of M2 and more distal MCA branches bilaterally.   Posterior circulation: The V4 segment on the right is dominant. There is mild irregularity and narrowing on the left. The basilar artery is patent. Focal outpouching projecting superior laterally on the left from the basilar tip measuring approximately 2 mm on image 131 of 430 is favored to be related to an otherwise markedly diminutive P1 segment of the left PCA although an aneurysm could also give this appearance. Areas of  irregularity and narrowing of the PCAs are suspected bilaterally.   CTA NECK FINDINGS:   Evaluation of the aortic arch and arch vessels is limited due to streak artifact from adjacent dense venous contrast. There are mild atherosclerotic changes of the visualized portion of the arch.   Carotid vessels:   The proximal common carotid arteries are degraded by artifact. They otherwise are patent. The carotid bifurcations demonstrate no measurable stenosis. There is tortuosity of the cervical ICAs bilaterally with subtle luminal irregularity but no measurable stenosis.   Vertebral vessels:   The cervical segments of the vertebral arteries are tortuous. There is subtle multifocal narrowing bilaterally due to impression from degenerative changes of the cervical spine.   Mucosal thickening and fluid are noted within the maxillary sinuses.   There are ground-glass opacities and irregular spiculated nodules in the right upper lobe, incompletely included on the obtained field-of-view but measuring up to at least 8-9 mm.   There is reversal of the normal cervical lordosis. There are multilevel degenerative changes of the cervical spine with associated central canal and neuroforaminal stenosis.          1. There is multifocal irregularity and narrowing of the anterior and posterior circulation vasculature without proximal large branch vessel cutoff. Please note MRI with diffusion-weighted imaging would be a more sensitive means of assessing for acute ischemic injury. 2. Focal outpouching arising from the communicating segment of the right ICA could represent an infundibular origin of an otherwise very small, poorly seen vessel or sub 2 mm aneurysm. Apparent focal outpouching projecting anteriorly and to the left from the basilar tip may represent an otherwise markedly diminutive P1 segment of the left PCA although an aneurysm is not excluded. 3. Mild, multilevel narrowing of the cervical segments of the vertebral arteries due to  impression from degenerative changes of the cervical spine. 4. Subtle luminal irregularity of the mid to distal cervical ICAs superimposed on vascular tortuosity is nonspecific but may be seen with FMD among other etiologies. There is no measurable stenosis. 5. Incompletely imaged irregular nodules in the right upper lobe with patchy ground-glass opacities are nonspecific and could be infectious or inflammatory in nature. Dedicated chest CT is recommended for further evaluation.   MACRO: None   Signed by: Mary Grace Byrd 12/8/2024 1:08 PM Dictation workstation:   [id number]     CT head wo IV contrast     Result Date: 12/8/2024  Interpreted By:  Gi Bloom, STUDY: CT HEAD WO IV CONTRAST;  12/8/2024 12:03 pm   INDICATION: Signs/Symptoms:dizziness.     COMPARISON: 05/24/2022   ACCESSION NUMBER(S): [id number]   ORDERING CLINICIAN: LUCIA ECHEVERRIA   TECHNIQUE: Unenhanced CT images of the head were obtained.   FINDINGS: The ventricles, cisterns and sulci are prominent, consistent with diffuse volume loss. There are areas of nonspecific white matter hypodensity, which are probably age related or microvascular in nature. These findings are similar to the prior exam. There is no acute intracranial hemorrhage, mass effect or midline shift. No extraaxial fluid collection.   No focal calvarial lesion.   Mild left ethmoid and bilateral maxillary sinus mucosal thickening. Small amount of fluid in the left maxillary sinus.            No acute intracranial hemorrhage or mass-effect.   Multifocal sinus mucosal thickening and small amount of fluid in the left maxillary sinus.   MACRO: None.   Signed by: Gi Bloom 12/8/2024 12:18 PM Dictation workstation:   [id number]     XR chest 2 views     Result Date: 12/8/2024  Interpreted By:  Gi Bloom, STUDY: XR CHEST 2 VIEWS;  12/8/2024 10:28 am   INDICATION: Signs/Symptoms:recent chest infection, burning in chest.     COMPARISON: 12/12/2019   ACCESSION NUMBER(S): [id number]    ORDERING CLINICIAN: LUCIA ECHEVERRIA   FINDINGS: Artifact from overlying monitoring leads noted. Questionable retrocardiac infiltrate versus confluence of shadows. No pleural effusion or pneumothorax. The cardiac silhouette is upper limits of normal in size status post sternotomy.        Questionable retrocardiac infiltrate. Clinical correlation and follow-up recommended.   MACRO: None.   Signed by: Gi Bloom 12/8/2024 10:38 AM Dictation workstation:   [id number]            Results for orders placed or performed during the hospital encounter of 12/08/24 (from the past 24 hours)   CBC and Auto Differential   Result Value Ref Range     WBC 20.4 (H) 4.4 - 11.3 x10*3/uL     nRBC 0.0 0.0 - 0.0 /100 WBCs     RBC 4.40 4.00 - 5.20 x10*6/uL     Hemoglobin 13.6 12.0 - 16.0 g/dL     Hematocrit 40.8 36.0 - 46.0 %     MCV 93 80 - 100 fL     MCH 30.9 26.0 - 34.0 pg     MCHC 33.3 32.0 - 36.0 g/dL     RDW 13.2 11.5 - 14.5 %     Platelets 308 150 - 450 x10*3/uL     Neutrophils % 83.8 40.0 - 80.0 %     Immature Granulocytes %, Automated 1.1 (H) 0.0 - 0.9 %     Lymphocytes % 8.7 13.0 - 44.0 %     Monocytes % 6.0 2.0 - 10.0 %     Eosinophils % 0.1 0.0 - 6.0 %     Basophils % 0.3 0.0 - 2.0 %     Neutrophils Absolute 17.07 (H) 1.60 - 5.50 x10*3/uL     Immature Granulocytes Absolute, Automated 0.22 0.00 - 0.50 x10*3/uL     Lymphocytes Absolute 1.77 0.80 - 3.00 x10*3/uL     Monocytes Absolute 1.23 (H) 0.05 - 0.80 x10*3/uL     Eosinophils Absolute 0.03 0.00 - 0.40 x10*3/uL     Basophils Absolute 0.06 0.00 - 0.10 x10*3/uL   Comprehensive metabolic panel   Result Value Ref Range     Glucose 127 (H) 74 - 99 mg/dL     Sodium 137 136 - 145 mmol/L     Potassium 4.0 3.5 - 5.3 mmol/L     Chloride 101 98 - 107 mmol/L     Bicarbonate 26 21 - 32 mmol/L     Anion Gap 14 10 - 20 mmol/L     Urea Nitrogen 18 6 - 23 mg/dL     Creatinine 1.09 (H) 0.50 - 1.05 mg/dL     eGFR 52 (L) >60 mL/min/1.73m*2     Calcium 10.9 (H) 8.6 - 10.3 mg/dL     Albumin 3.9 3.4  - 5.0 g/dL     Alkaline Phosphatase 77 33 - 136 U/L     Total Protein 7.6 6.4 - 8.2 g/dL     AST 15 9 - 39 U/L     Bilirubin, Total 0.7 0.0 - 1.2 mg/dL     ALT 13 7 - 45 U/L   B-Type Natriuretic Peptide   Result Value Ref Range      (H) 0 - 99 pg/mL   Troponin I, High Sensitivity, Initial   Result Value Ref Range     Troponin I, High Sensitivity 12 0 - 13 ng/L   TSH with reflex to Free T4 if abnormal   Result Value Ref Range     Thyroid Stimulating Hormone 3.03 0.44 - 3.98 mIU/L   Magnesium   Result Value Ref Range     Magnesium 1.88 1.60 - 2.40 mg/dL   BLOOD GAS VENOUS FULL PANEL   Result Value Ref Range     POCT pH, Venous 7.38 7.33 - 7.43 pH     POCT pCO2, Venous 46 41 - 51 mm Hg     POCT pO2, Venous 17 (L) 35 - 45 mm Hg     POCT SO2, Venous 24 (L) 45 - 75 %     POCT Oxy Hemoglobin, Venous 23.9 (L) 45.0 - 75.0 %     POCT Hematocrit Calculated, Venous 38.0 36.0 - 46.0 %     POCT Sodium, Venous 137 136 - 145 mmol/L     POCT Potassium, Venous 4.4 3.5 - 5.3 mmol/L     POCT Chloride, Venous 100 98 - 107 mmol/L     POCT Ionized Calicum, Venous 1.37 (H) 1.10 - 1.33 mmol/L     POCT Glucose, Venous 130 (H) 74 - 99 mg/dL     POCT Lactate, Venous 1.9 0.4 - 2.0 mmol/L     POCT Base Excess, Venous 1.6 -2.0 - 3.0 mmol/L     POCT HCO3 Calculated, Venous 27.2 (H) 22.0 - 26.0 mmol/L     POCT Hemoglobin, Venous 12.6 12.0 - 16.0 g/dL     POCT Anion Gap, Venous 14.0 10.0 - 25.0 mmol/L     Patient Temperature 37.0 degrees Celsius     FiO2 21 %   Lactate   Result Value Ref Range     Lactate 1.7 0.4 - 2.0 mmol/L   Coagulation Screen   Result Value Ref Range     Protime 15.8 (H) 9.8 - 12.8 seconds     INR 1.4 (H) 0.9 - 1.1     aPTT 26 (L) 27 - 38 seconds   Troponin, High Sensitivity, 1 Hour   Result Value Ref Range     Troponin I, High Sensitivity 61 (HH) 0 - 13 ng/L               Assessment/Plan     Assessment & Plan  Atrial fibrillation with RVR (Multi)     Atrial fibrillation with RVR  Elevated high-sensitivity  troponins  Elevated BNP  History of diastolic heart failure  CABG x 4 (2019)  -Cardiology consult and recommendations appreciated  -Patient found to be in atrial fibrillation with RVR, new onset in the ED.  EKG, per ED physician, shows 134 bpm, atrial fibrillation, normal axis, QRS 84, QTc 471, intermittent elevations in lead III less than 1 mm, otherwise normal ST and T wave pattern with no evidence of acute ischemia or other acute findings  -Patient initially given 5 mg IV Lopressor which was unsuccessful, patient then given 250 mcg of digoxin per Dr. Helton.   -Patient in NSR at the time of my interview with HR in the 70's  -Last echo from 2019, echo added on  -Patient on 75 mg metoprolol 2 times daily at home, continue.  Adjustments per cardiology.  IV Lopressor as needed  -Continue home aspirin, doxazosin  -[id number] score 6, initiation of anticoagulation per cardiology/attending     Pneumonia  Shortness of breath  Leukocytosis  -Chest x-ray shows questionable retrocardiac infiltrate  -Will empirically treat for pneumonia, given patient states she has been sick with a “ chest infection” since last Tuesday and was recently on steroids/antibiotics.  Patient also with leukocytosis  -Continue IV levofloxacin given allergies to amoxicillin, erythromycin, cephalexin  -DuoNebs and albuterol as needed, oxygen as needed  -Strep pneumo and Legionella antigen, Pro-Yehuda added on and pending  -Following blood cultures and respiratory cultures     Acute kidney injury  Hypercalcemia  -Creatinine 1.09 today, 0.87 on 11/16  -Calcium 10.9 today, hold calcium supplements  -Avoid nephrotoxic medications, monitor with BMP     GERD  -Continue home medications as ordered     DVT prophylaxis  -Heparin  -SCDs        I spent 65 minutes in the professional and overall care of this patient.  Patient fully evaluated and plan as above     Chikis Barone PA-C                 Revision History       Patient fully evaluated  12/09  for  Principal Problem:    Atrial fibrillation with RVR (Multi)  Active Problems:    Elevated troponin    Patient seen resting in bed with head of bed elevated, no s/s or c/o acute difficulties at this time.  Vital signs for last 24 hours Temp:  [36.2 °C (97.2 °F)-37.1 °C (98.8 °F)] 36.2 °C (97.2 °F)  Heart Rate:  [67-75] 71  BP: (116-147)/(63-90) 116/90    No intake/output data recorded.  Patient still requiring frequent cardiac and SPO2 monitoring. Continue aggressive pulmonary hygiene and oral hygiene. Off loading as tolerated for skin integrity. Medications and labs reviewed-   Results for orders placed or performed during the hospital encounter of 12/08/24 (from the past 24 hours)   Comprehensive Metabolic Panel   Result Value Ref Range    Glucose 101 (H) 74 - 99 mg/dL    Sodium 137 136 - 145 mmol/L    Potassium 5.2 3.5 - 5.3 mmol/L    Chloride 103 98 - 107 mmol/L    Bicarbonate 30 21 - 32 mmol/L    Anion Gap 9 (L) 10 - 20 mmol/L    Urea Nitrogen 16 6 - 23 mg/dL    Creatinine 0.85 0.50 - 1.05 mg/dL    eGFR 70 >60 mL/min/1.73m*2    Calcium 9.8 8.6 - 10.3 mg/dL    Albumin 3.2 (L) 3.4 - 5.0 g/dL    Alkaline Phosphatase 60 33 - 136 U/L    Total Protein 6.3 (L) 6.4 - 8.2 g/dL    AST 14 9 - 39 U/L    Bilirubin, Total 0.4 0.0 - 1.2 mg/dL    ALT 8 7 - 45 U/L   CBC   Result Value Ref Range    WBC 9.3 4.4 - 11.3 x10*3/uL    nRBC 0.0 0.0 - 0.0 /100 WBCs    RBC 4.01 4.00 - 5.20 x10*6/uL    Hemoglobin 12.1 12.0 - 16.0 g/dL    Hematocrit 37.9 36.0 - 46.0 %    MCV 95 80 - 100 fL    MCH 30.2 26.0 - 34.0 pg    MCHC 31.9 (L) 32.0 - 36.0 g/dL    RDW 13.2 11.5 - 14.5 %    Platelets 345 150 - 450 x10*3/uL      Patient recently received an antibiotic (last 12 hours)       None           Pt awake, conversant, and A&O x 3. Pt's heart rate was in the 90's upon examination. Pt stated that she has been experiencing SOB and labored breathing. Today's labs read a glucose of 89, sodium of 138, WBC of 15.2, RBC of 3.97, hemoglobin of 11.8, and  hematocrit of 37.6. Cardiology was consulted and provided their input. Still awaiting the results of the echocardiogram which will help determine if a heart cath is needed. Pt's metoprolol has been increased to 100 mg to help better manage her atrial fibrillation. Pt is currently taking Xarelto 20 mg daily for protection against thromboembolism. As per pharmacy - her levofloxacin 750 mg IV q24hrs was adjusted to 750 mg IV q48hrs to better treat her pneumonia and is better for her renal function. Plan discussed with interdisciplinary team, continue current and repeat labs in the AM.     Discharge planning discussed with patient and care team. Therapy evaluations ordered.  Patient aware and agreeable to current plan, continue plan as above.     I spent a total of 50 minutes on the date of the service which included preparing to see the patient, face-to-face patient care, completing clinical documentation, obtaining and/or reviewing separately obtained history, performing a medically appropriate examination, counseling and educating the patient/family/caregiver, ordering medications, tests, or procedures, communicating with other HCPs (not separately reported), independently interpreting results (not separately reported), communicating results to the patient/family/caregiver, and care coordination (not separately reported).       Patient fully evaluated  12/10  for Principal Problem:    Atrial fibrillation with RVR (Multi)  Active Problems:    Elevated troponin    Patient seen resting in bed with head of bed elevated, no s/s or c/o acute difficulties at this time.  Vital signs for last 24 hours Temp:  [36.2 °C (97.2 °F)-37.1 °C (98.8 °F)] 36.2 °C (97.2 °F)  Heart Rate:  [67-75] 71  BP: (116-147)/(63-90) 116/90    No intake/output data recorded.  Patient still requiring frequent cardiac and SPO2 monitoring. Continue aggressive pulmonary hygiene and oral hygiene. Off loading as tolerated for skin integrity. Medications  and labs reviewed-   Results for orders placed or performed during the hospital encounter of 12/08/24 (from the past 24 hours)   Comprehensive Metabolic Panel   Result Value Ref Range    Glucose 101 (H) 74 - 99 mg/dL    Sodium 137 136 - 145 mmol/L    Potassium 5.2 3.5 - 5.3 mmol/L    Chloride 103 98 - 107 mmol/L    Bicarbonate 30 21 - 32 mmol/L    Anion Gap 9 (L) 10 - 20 mmol/L    Urea Nitrogen 16 6 - 23 mg/dL    Creatinine 0.85 0.50 - 1.05 mg/dL    eGFR 70 >60 mL/min/1.73m*2    Calcium 9.8 8.6 - 10.3 mg/dL    Albumin 3.2 (L) 3.4 - 5.0 g/dL    Alkaline Phosphatase 60 33 - 136 U/L    Total Protein 6.3 (L) 6.4 - 8.2 g/dL    AST 14 9 - 39 U/L    Bilirubin, Total 0.4 0.0 - 1.2 mg/dL    ALT 8 7 - 45 U/L   CBC   Result Value Ref Range    WBC 9.3 4.4 - 11.3 x10*3/uL    nRBC 0.0 0.0 - 0.0 /100 WBCs    RBC 4.01 4.00 - 5.20 x10*6/uL    Hemoglobin 12.1 12.0 - 16.0 g/dL    Hematocrit 37.9 36.0 - 46.0 %    MCV 95 80 - 100 fL    MCH 30.2 26.0 - 34.0 pg    MCHC 31.9 (L) 32.0 - 36.0 g/dL    RDW 13.2 11.5 - 14.5 %    Platelets 345 150 - 450 x10*3/uL      Patient recently received an antibiotic (last 12 hours)       Date/Time Action Medication Dose Rate    12/10/24 0941 New Bag    levoFLOXacin (Levaquin) 750 mg in dextrose 5%  mL 750 mg 100 mL/hr           Pt will have a cardiac catheterization performed today and we will review the results afterwards. We will continue to monitor and modify the plan based on the results of the cardiac catheterization. The most recent labs read a glucose of 102, sodium of 137, WBC of 12, RBC of 4.07, hemoglobin of 12, and hematocrit of 37.6.  Plan discussed with interdisciplinary team, continue current and repeat labs in the AM.  Still with some systolic hypertension and medication adjustments after heart catheterization.  Echocardiogram without wall motion abnormalities.    Discharge planning discussed with patient and care team. Therapy evaluations ordered. Patient aware and agreeable to  current plan, continue plan as above.     I spent a total of 50 minutes on the date of the service which included preparing to see the patient, face-to-face patient care, completing clinical documentation, obtaining and/or reviewing separately obtained history, performing a medically appropriate examination, counseling and educating the patient/family/caregiver, ordering medications, tests, or procedures, communicating with other HCPs (not separately reported), independently interpreting results (not separately reported), communicating results to the patient/family/caregiver, and care coordination (not separately reported).       Patient fully evaluated  12/11  for    Problem List Items Addressed This Visit          Cardiac and Vasculature    Acute myocardial infarct greater than 3 months ago    Relevant Orders    Referral to [facility]    S/P CABG x 4    Relevant Orders    Referral to [facility]    Paroxysmal atrial fibrillation (Multi)    Relevant Medications    metoprolol tartrate (Lopressor) injection 5 mg    metoprolol succinate XL (Toprol-XL) 24 hr tablet 100 mg    Diastolic HF (heart failure)    Relevant Medications    metoprolol tartrate (Lopressor) injection 5 mg    metoprolol succinate XL (Toprol-XL) 24 hr tablet 100 mg    Other Relevant Orders    Referral to [facility]    * (Principal) Atrial fibrillation with RVR (Multi) - Primary    Relevant Medications    metoprolol tartrate (Lopressor) injection 5 mg    metoprolol succinate XL (Toprol-XL) 24 hr tablet 100 mg    Other Relevant Orders    Transthoracic Echo (TTE) Complete (Completed)    Referral to [facility]    Elevated troponin    Relevant Orders    Case Request Cath Lab: Left Heart Cath, With LV (Completed)    Cardiac Catheterization Procedure (Completed)     Other Visit Diagnoses       Pneumonia due to infectious organism, unspecified laterality, unspecified part of lung        Shortness of breath         Relevant Orders    Transthoracic Echo (TTE) Complete (Completed)    Abnormal electrocardiogram (ECG) (EKG)        Non-ST elevation (NSTEMI) myocardial infarction (Multi)        Relevant Medications    metoprolol tartrate (Lopressor) injection 5 mg (Completed)    digoxin (Lanoxin) injection 250 mcg (Completed)    metoprolol tartrate (Lopressor) injection 5 mg    metoprolol succinate XL (Toprol-XL) 24 hr tablet 100 mg    clopidogrel (Plavix) tablet 600 mg (Completed)    Other Relevant Orders    Referral to [facility]          Patient seen resting in bed with head of bed elevated, no s/s or c/o acute difficulties at this time.  Vital signs for last 24 hours Temp:  [36.2 °C (97.2 °F)-37.1 °C (98.8 °F)] 36.2 °C (97.2 °F)  Heart Rate:  [67-75] 71  BP: (116-147)/(63-90) 116/90    No intake/output data recorded.  Patient still requiring frequent cardiac and SPO2 monitoring. Continue aggressive pulmonary hygiene and oral hygiene. Off loading as tolerated for skin integrity. Medications and labs reviewed-   Results for orders placed or performed during the hospital encounter of 12/08/24 (from the past 24 hours)   Comprehensive Metabolic Panel   Result Value Ref Range    Glucose 101 (H) 74 - 99 mg/dL    Sodium 137 136 - 145 mmol/L    Potassium 5.2 3.5 - 5.3 mmol/L    Chloride 103 98 - 107 mmol/L    Bicarbonate 30 21 - 32 mmol/L    Anion Gap 9 (L) 10 - 20 mmol/L    Urea Nitrogen 16 6 - 23 mg/dL    Creatinine 0.85 0.50 - 1.05 mg/dL    eGFR 70 >60 mL/min/1.73m*2    Calcium 9.8 8.6 - 10.3 mg/dL    Albumin 3.2 (L) 3.4 - 5.0 g/dL    Alkaline Phosphatase 60 33 - 136 U/L    Total Protein 6.3 (L) 6.4 - 8.2 g/dL    AST 14 9 - 39 U/L    Bilirubin, Total 0.4 0.0 - 1.2 mg/dL    ALT 8 7 - 45 U/L   CBC   Result Value Ref Range    WBC 9.3 4.4 - 11.3 x10*3/uL    nRBC 0.0 0.0 - 0.0 /100 WBCs    RBC 4.01 4.00 - 5.20 x10*6/uL    Hemoglobin 12.1 12.0 - 16.0 g/dL    Hematocrit 37.9 36.0 - 46.0 %    MCV 95 80 - 100 fL    MCH 30.2  26.0 - 34.0 pg    MCHC 31.9 (L) 32.0 - 36.0 g/dL    RDW 13.2 11.5 - 14.5 %    Platelets 345 150 - 450 x10*3/uL      Patient recently received an antibiotic (last 12 hours)       None           Pt awake, conversant, and A&O x 3. Pt still has a cough but is doing better. Will order a nocturnal pulse ox for sleep apnea and Tessalon Perles for her cough. Pt's most recent labs read a glucose of 101, sodium of 137, WBC of 9.3, hemoglobin of 12.1, and hematocrit of 37.9. Anticipate discharge tomorrow and will transition pt to PO antibiotics tonight. Will continue to monitor. Plan discussed with interdisciplinary team, continue current and repeat labs in the AM.     Discharge planning discussed with patient and care team. Therapy evaluations ordered.  Patient aware and agreeable to current plan, continue plan as above.     I spent a total of 50 minutes on the date of the service which included preparing to see the patient, face-to-face patient care, completing clinical documentation, obtaining and/or reviewing separately obtained history, performing a medically appropriate examination, counseling and educating the patient/family/caregiver, ordering medications, tests, or procedures, communicating with other HCPs (not separately reported), independently interpreting results (not separately reported), communicating results to the patient/family/caregiver, and care coordination (not separately reported).   Patient did not  IZABELLA LO, Student PA       no...